# Patient Record
Sex: FEMALE | Race: OTHER | NOT HISPANIC OR LATINO
[De-identification: names, ages, dates, MRNs, and addresses within clinical notes are randomized per-mention and may not be internally consistent; named-entity substitution may affect disease eponyms.]

---

## 2018-03-16 PROBLEM — Z00.00 ENCOUNTER FOR PREVENTIVE HEALTH EXAMINATION: Status: ACTIVE | Noted: 2018-03-16

## 2018-05-15 ENCOUNTER — APPOINTMENT (OUTPATIENT)
Dept: FAMILY MEDICINE | Facility: CLINIC | Age: 63
End: 2018-05-15

## 2018-08-13 ENCOUNTER — APPOINTMENT (OUTPATIENT)
Dept: FAMILY MEDICINE | Facility: CLINIC | Age: 63
End: 2018-08-13

## 2020-02-05 ENCOUNTER — APPOINTMENT (OUTPATIENT)
Dept: OTOLARYNGOLOGY | Facility: CLINIC | Age: 65
End: 2020-02-05
Payer: MEDICARE

## 2020-02-05 VITALS
SYSTOLIC BLOOD PRESSURE: 119 MMHG | WEIGHT: 140 LBS | HEART RATE: 80 BPM | HEIGHT: 66 IN | BODY MASS INDEX: 22.5 KG/M2 | DIASTOLIC BLOOD PRESSURE: 78 MMHG

## 2020-02-05 DIAGNOSIS — Z86.39 PERSONAL HISTORY OF OTHER ENDOCRINE, NUTRITIONAL AND METABOLIC DISEASE: ICD-10-CM

## 2020-02-05 DIAGNOSIS — Z78.9 OTHER SPECIFIED HEALTH STATUS: ICD-10-CM

## 2020-02-05 PROCEDURE — 99203 OFFICE O/P NEW LOW 30 MIN: CPT

## 2020-02-05 NOTE — REVIEW OF SYSTEMS
[Ear Pain] : ear pain [Ear Itch] : ear itch [Lightheadedness] : lightheadedness [Ear Noises] : ear noises [Problem Snoring] : problem snoring [Anxiety] : anxiety [Easy Bruising] : a tendency for easy bruising [Negative] : Endocrine [Patient Intake Form Reviewed] : Patient intake form was reviewed

## 2020-10-09 ENCOUNTER — APPOINTMENT (OUTPATIENT)
Dept: OTOLARYNGOLOGY | Facility: CLINIC | Age: 65
End: 2020-10-09
Payer: MEDICARE

## 2020-10-09 VITALS — HEIGHT: 66 IN | BODY MASS INDEX: 21.69 KG/M2 | TEMPERATURE: 97.4 F | WEIGHT: 135 LBS

## 2020-10-09 DIAGNOSIS — H69.83 OTHER SPECIFIED DISORDERS OF EUSTACHIAN TUBE, BILATERAL: ICD-10-CM

## 2020-10-09 DIAGNOSIS — H92.01 OTALGIA, RIGHT EAR: ICD-10-CM

## 2020-10-09 PROCEDURE — 92557 COMPREHENSIVE HEARING TEST: CPT

## 2020-10-09 PROCEDURE — 92567 TYMPANOMETRY: CPT

## 2020-10-09 PROCEDURE — 99214 OFFICE O/P EST MOD 30 MIN: CPT | Mod: 25

## 2020-10-09 NOTE — PHYSICAL EXAM
[Midline] : trachea located in midline position [Normal] : no rashes [de-identified] : dayne cleared au

## 2020-10-09 NOTE — REVIEW OF SYSTEMS
[Dizziness] : dizziness [Negative] : Heme/Lymph [Patient Intake Form Reviewed] : Patient intake form was reviewed

## 2020-10-09 NOTE — ASSESSMENT
[FreeTextEntry1] : cerumen cleared\par hx most consistent w bppv\par trial flores daroff exercises\par consider vestibular rehab

## 2020-10-09 NOTE — HISTORY OF PRESENT ILLNESS
[de-identified] : co rt ear plugging no drainage\par usually hearing ok\par co room spinning daily lasts 30 sec occurring daily\par no co hearing , tinnitus or pressure, no migraine, no focal weakness

## 2020-10-20 ENCOUNTER — APPOINTMENT (OUTPATIENT)
Dept: DISASTER EMERGENCY | Facility: CLINIC | Age: 65
End: 2020-10-20

## 2020-10-20 DIAGNOSIS — Z01.818 ENCOUNTER FOR OTHER PREPROCEDURAL EXAMINATION: ICD-10-CM

## 2020-10-21 LAB — SARS-COV-2 N GENE NPH QL NAA+PROBE: NOT DETECTED

## 2020-10-22 ENCOUNTER — FORM ENCOUNTER (OUTPATIENT)
Age: 65
End: 2020-10-22

## 2020-10-22 NOTE — H&P PST ADULT - HISTORY OF PRESENT ILLNESS
65 year old female with h/o known mitral valve prolapse who c/o worsening SOB with exertion and fatigue.  Recent echo  revealed moderated MR.  Pt for JAMIN/R&LHC.      Symptoms:        Angina (Class):        Ischemic Symptoms:     Heart Failure:        Systolic/Diastolic/Combined:        NYHA Class (within 2 weeks):     Assessment of LVEF:       EF: 67%       Assessed by: echo       Date: 10/1/20    Prior Cardiac Interventions: NONE  1/26/2016 [facility]  EF 60%  normal coronary arteries           Noninvasive Testing:   Stress Test: Date:        Protocol:        Duration of Exercise:        Symptoms:        EKG Changes:        DTS:        Myocardial Imaging:        Risk Assessment:     Echo: 10/1/2020  EF 67%  mod MR  mod prolapse of mitral valve leaflets  mild TR    Antianginal Therapies:        Beta Blockers:         Calcium Channel Blockers:        Long Acting Nitrates:        Ranexa:     Associated Risk Factors:        Cerebrovascular Disease: N/A       Chronic Lung Disease: N/A       Peripheral Arterial Disease: N/A       Chronic Kidney Disease (if yes, what is GFR): N/A       Uncontrolled Diabetes (if yes, what is HgbA1C or FBS): N/A       Poorly Controlled Hypertension (if yes, what is SBP): N/A       Morbid Obesity (if yes, what is BMI): N/A       History of Recent Ventricular Arrhythmia: N/A       Inability to Ambulate Safely: N/A       Need for Therapeutic Anticoagulation: N/A       Antiplatelet or Contrast Allergy: N/A 65 year old female with h/o known mitral valve prolapse who c/o worsening SOB with exertion and fatigue.  Recent echo  revealed moderated MR.  Pt for JAMIN/R&LHC.  Pt currently very anxious and a poor historian       Symptoms: SOB        Angina (Class):        Ischemic Symptoms:     Heart Failure:        Systolic/Diastolic/Combined:        NYHA Class (within 2 weeks):     Assessment of LVEF:       EF: 67%       Assessed by: echo       Date: 10/1/20    Prior Cardiac Interventions: NONE  1/26/2016 [facility]  EF 60%  normal coronary arteries           Noninvasive Testing:   Stress Test: Date:        Protocol:        Duration of Exercise:        Symptoms:        EKG Changes:        DTS:        Myocardial Imaging:        Risk Assessment:     Echo: 10/1/2020  EF 67%  mod MR  mod prolapse of mitral valve leaflets  mild TR    Antianginal Therapies:        Beta Blockers:         Calcium Channel Blockers:        Long Acting Nitrates:        Ranexa:     Associated Risk Factors:        Cerebrovascular Disease: N/A       Chronic Lung Disease: N/A       Peripheral Arterial Disease: N/A       Chronic Kidney Disease (if yes, what is GFR): N/A       Uncontrolled Diabetes (if yes, what is HgbA1C or FBS): N/A       Poorly Controlled Hypertension (if yes, what is SBP): N/A       Morbid Obesity (if yes, what is BMI): N/A       History of Recent Ventricular Arrhythmia: N/A       Inability to Ambulate Safely: N/A       Need for Therapeutic Anticoagulation: N/A       Antiplatelet or Contrast Allergy: N/A

## 2020-10-22 NOTE — H&P PST ADULT - ASSESSMENT
59 year old female c/o SOB with worsening mitral regurgitation.  For JAMIN/L&RHC    ASA  Mallampti  GFR  Bleeding risk 59 year old female c/o SOB with worsening mitral regurgitation.  For JAMIN/L&RHC    ASA2  Mallampti2  GFR  Bleeding risk  PRE-PROCEDURE ASSESSMENT  JAMIN RHC/LHC -Patient seen and examined  -Labs and EKG reviewed   -Pre-procedure teaching completed with patient and family  -  Informed consent obtained -Questions answered  - Pt received [id number] prior to cardiac cath   Risks & benefits of procedure and sedation and risks and benefits for the alternative therapy have been explained to the patient in layman’s terms including but not limited to: allergic reaction, bleeding, infection, arrhythmia, respiratory compromise, renal and vascular compromise, limb damage, MI, CVA, emergent CABG/Vascular Surgery and death. Informed consent obtained and in chart.

## 2020-10-23 ENCOUNTER — TRANSCRIPTION ENCOUNTER (OUTPATIENT)
Age: 65
End: 2020-10-23

## 2020-10-23 ENCOUNTER — OUTPATIENT (OUTPATIENT)
Dept: OUTPATIENT SERVICES | Facility: HOSPITAL | Age: 65
LOS: 1 days | End: 2020-10-23
Payer: MEDICARE

## 2020-10-23 VITALS
SYSTOLIC BLOOD PRESSURE: 152 MMHG | TEMPERATURE: 98 F | OXYGEN SATURATION: 97 % | HEART RATE: 77 BPM | DIASTOLIC BLOOD PRESSURE: 79 MMHG | RESPIRATION RATE: 16 BRPM

## 2020-10-23 VITALS
DIASTOLIC BLOOD PRESSURE: 80 MMHG | SYSTOLIC BLOOD PRESSURE: 117 MMHG | OXYGEN SATURATION: 99 % | HEART RATE: 79 BPM | RESPIRATION RATE: 16 BRPM

## 2020-10-23 DIAGNOSIS — I34.0 NONRHEUMATIC MITRAL (VALVE) INSUFFICIENCY: ICD-10-CM

## 2020-10-23 DIAGNOSIS — Z98.890 OTHER SPECIFIED POSTPROCEDURAL STATES: Chronic | ICD-10-CM

## 2020-10-23 DIAGNOSIS — R06.02 SHORTNESS OF BREATH: ICD-10-CM

## 2020-10-23 LAB
ANION GAP SERPL CALC-SCNC: 11 MMOL/L — SIGNIFICANT CHANGE UP (ref 5–17)
APTT BLD: 29.8 SEC — SIGNIFICANT CHANGE UP (ref 27.5–35.5)
BUN SERPL-MCNC: 14 MG/DL — SIGNIFICANT CHANGE UP (ref 8–20)
CALCIUM SERPL-MCNC: 9.4 MG/DL — SIGNIFICANT CHANGE UP (ref 8.6–10.2)
CHLORIDE SERPL-SCNC: 102 MMOL/L — SIGNIFICANT CHANGE UP (ref 98–107)
CO2 SERPL-SCNC: 27 MMOL/L — SIGNIFICANT CHANGE UP (ref 22–29)
CREAT SERPL-MCNC: 0.58 MG/DL — SIGNIFICANT CHANGE UP (ref 0.5–1.3)
GLUCOSE SERPL-MCNC: 103 MG/DL — HIGH (ref 70–99)
HCT VFR BLD CALC: 42.8 % — SIGNIFICANT CHANGE UP (ref 34.5–45)
HGB BLD-MCNC: 14.4 G/DL — SIGNIFICANT CHANGE UP (ref 11.5–15.5)
INR BLD: 1.01 RATIO — SIGNIFICANT CHANGE UP (ref 0.88–1.16)
MCHC RBC-ENTMCNC: 29.9 PG — SIGNIFICANT CHANGE UP (ref 27–34)
MCHC RBC-ENTMCNC: 33.6 GM/DL — SIGNIFICANT CHANGE UP (ref 32–36)
MCV RBC AUTO: 89 FL — SIGNIFICANT CHANGE UP (ref 80–100)
PLATELET # BLD AUTO: 201 K/UL — SIGNIFICANT CHANGE UP (ref 150–400)
POTASSIUM SERPL-MCNC: 5.3 MMOL/L — SIGNIFICANT CHANGE UP (ref 3.5–5.3)
POTASSIUM SERPL-SCNC: 5.3 MMOL/L — SIGNIFICANT CHANGE UP (ref 3.5–5.3)
PROTHROM AB SERPL-ACNC: 11.7 SEC — SIGNIFICANT CHANGE UP (ref 10.6–13.6)
RBC # BLD: 4.81 M/UL — SIGNIFICANT CHANGE UP (ref 3.8–5.2)
RBC # FLD: 12.5 % — SIGNIFICANT CHANGE UP (ref 10.3–14.5)
SODIUM SERPL-SCNC: 140 MMOL/L — SIGNIFICANT CHANGE UP (ref 135–145)
WBC # BLD: 5.07 K/UL — SIGNIFICANT CHANGE UP (ref 3.8–10.5)
WBC # FLD AUTO: 5.07 K/UL — SIGNIFICANT CHANGE UP (ref 3.8–10.5)

## 2020-10-23 PROCEDURE — 80048 BASIC METABOLIC PNL TOTAL CA: CPT

## 2020-10-23 PROCEDURE — 36415 COLL VENOUS BLD VENIPUNCTURE: CPT

## 2020-10-23 PROCEDURE — 93456 R HRT CORONARY ARTERY ANGIO: CPT

## 2020-10-23 PROCEDURE — 85610 PROTHROMBIN TIME: CPT

## 2020-10-23 PROCEDURE — 99153 MOD SED SAME PHYS/QHP EA: CPT

## 2020-10-23 PROCEDURE — 93325 DOPPLER ECHO COLOR FLOW MAPG: CPT

## 2020-10-23 PROCEDURE — 93005 ELECTROCARDIOGRAM TRACING: CPT

## 2020-10-23 PROCEDURE — 85730 THROMBOPLASTIN TIME PARTIAL: CPT

## 2020-10-23 PROCEDURE — 76376 3D RENDER W/INTRP POSTPROCES: CPT | Mod: 26

## 2020-10-23 PROCEDURE — 85027 COMPLETE CBC AUTOMATED: CPT

## 2020-10-23 PROCEDURE — 99152 MOD SED SAME PHYS/QHP 5/>YRS: CPT

## 2020-10-23 PROCEDURE — 93010 ELECTROCARDIOGRAM REPORT: CPT

## 2020-10-23 PROCEDURE — 93325 DOPPLER ECHO COLOR FLOW MAPG: CPT | Mod: 26

## 2020-10-23 PROCEDURE — 93312 ECHO TRANSESOPHAGEAL: CPT | Mod: 26

## 2020-10-23 PROCEDURE — 93456 R HRT CORONARY ARTERY ANGIO: CPT | Mod: 26

## 2020-10-23 PROCEDURE — C1894: CPT

## 2020-10-23 PROCEDURE — 93320 DOPPLER ECHO COMPLETE: CPT

## 2020-10-23 PROCEDURE — 93312 ECHO TRANSESOPHAGEAL: CPT

## 2020-10-23 PROCEDURE — C1769: CPT

## 2020-10-23 PROCEDURE — 93320 DOPPLER ECHO COMPLETE: CPT | Mod: 26

## 2020-10-23 NOTE — DISCHARGE NOTE PROVIDER - CARE PROVIDER_API CALL
Kelvin De Dios  SURGERY  [address] [place] [id number]  Phone: [phone]  Fax: [phone]  Follow Up Time:

## 2020-10-23 NOTE — DISCHARGE NOTE PROVIDER - HOSPITAL COURSE
65 year old female with h/o known mitral valve prolapse who c/o worsening SOB with exertion and fatigue.  Recent echo  revealed moderated MR.  Pt for JAMIN/R&LHC.  Pt currently very anxious and a poor historian     s/p JAMIN Severe MR with P2 prolapse fail no clots, no PFO EF 70 -75     s/p LHC revealing non obstructive CAD Severe MR   RHC RA 4/1/1, RV 27/6 PA 28/6/15 wedge 8   PA sat 77.4%   RA sat 75 .6%   AO sat 97.3 %       Patient feels well.  Denies chest pain, shortness of breath, dizziness or palpitations at this time    Right brachial band venous in place removed with issue   Right radial hemoband in place.  Procedure site CDI, no bleeding, no hematoma, able to move all digits with capillary refill < 3 seconds, fingers warm  [facility] consult called    Reviewed case with Dr Sang nixon PCP notified   -vital signs, labs, diet and activity per post procedure orders  - ambulate ad carlton post bedrest  -iv hydration  -encourage PO fluids  -plan of care discussed with patient, family and MD   -post procedure and d/c instructions reviewed  -follow up with MD in 1-2 weeks  -Discussed therapeutic lifestyle changes to reduce risk factors such as following a cardiac diet, weight loss, maintaining a healthy weight, exercise, smoking cessation, medication compliance, and regular follow-up  with MD to know your numbers (BP, cholesterol, weight, and glucose)

## 2020-10-23 NOTE — CONSULT NOTE ADULT - ASSESSMENT
A/P: 65 year old female who comes in with known mitral valve disease (mild-moderate MR). She has is active at baseline, and has been having progressive shortness of breath over the last couple of weeks. Patient had recent JAMIN showing severe MR with P2 prolapse and flail segment no PFO noted. EF is 70-75%, normal RV size, prominent left atrial appendage with decrease left atrial velocities no clot noted.  Patient 10/23/20 is now s/p [facility] showing non-obstructive CAD, PA pressures 28/6/15 wedge 8, Moderate TR.  CT surgery consulted for evaluation.

## 2020-10-23 NOTE — CONSULT NOTE ADULT - SUBJECTIVE AND OBJECTIVE BOX
Surgeon: Dr. De Dios     Consult requesting by: Dr. Domínguez     HISTORY OF PRESENT ILLNESS:  65 year old female who comes in with known mitral valve disease (mild-moderate MR). She has is active at baseline, and has been having progressive shortness of breath over the last couple of weeks. Patient had recent JAMIN showing severe MR with P2 prolapse and flail segment no PFO noted. EF is 70-75%, normal RV size, prominent left atrial appendage with decrease left atrial velocities no clot noted.  Patient 10/23/20 is now s/p LHC showing non-obstructive CAD, PA pressures 28/6/15 wedge 8, Moderate TR.  CT surgery consulted for evaluation.       PAST MEDICAL & SURGICAL HISTORY:  Mitral valve disease    H/O foot surgery  2018 Left foot neuroma    Medications: vitamins                        Allergies    statins (Muscle Pain)      SOCIAL HISTORY:  Not a smoker  No drugs   Drinks occasionally     FAMILY HISTORY:  FH: heart disease        Review of Systems  At rest patient denies any complaints at this time       PHYSICAL EXAM  Neuro: intact, pain is well controlled   Pulm: clear to auscultation bilaterally, apices to bases   CV: S1,S2,RRR pansystolic murmur diastolic   Extremities: warm and well perfused, distal pulses intact       Vital Signs Last 24 Hrs  T(C): 36.7 (23 Oct 2020 08:55), Max: 36.7 (23 Oct 2020 08:55)  T(F): 98.1 (23 Oct 2020 08:55), Max: 98.1 (23 Oct 2020 08:55)  HR: 79 (23 Oct 2020 13:30) (69 - 79)  BP: 117/80 (23 Oct 2020 13:30) (111/65 - 152/79)  BP(mean): --  RR: 16 (23 Oct 2020 13:30) (15 - 16)  SpO2: 99% (23 Oct 2020 13:30) (95% - 99%)          LABS:                        14.4   5.07  )-----------( 201      ( 23 Oct 2020 08:50 )             42.8     10-23    140  |  102  |  14.0  ----------------------------<  103<H>  5.3   |  27.0  |  0.58    Ca    9.4      23 Oct 2020 08:50      PT/INR - ( 23 Oct 2020 08:50 )   PT: 11.7 sec;   INR: 1.01 ratio         PTT - ( 23 Oct 2020 08:50 )  PTT:29.8 sec      JAMIN 10/23/20:    Summary:   1. Left ventricular ejection fraction, by visual estimation, is 65 to 70%. (3D-EF of 69%]   2. Normal global left ventricular systolic function.   3. Spectral Doppler shows restrictive pattern of left ventricular myocardial filling (Grade III diastolic dysfunction).   4. Normal RV size and function, estimated RVSP of 34 mmHg mildly elevated.   5. Severely enlarged left atrium.   6. Mildly enlarged right atrium.   7. Primary severe mitral regurgitation, Beverly type II. There is prolapsed and flailed P2-scallop of the mitral valve with severe eccentric mitral regurgitation with anteriorly directed jet and Coanda effect, systolic right pulmonary veins reversal noted.   8. No evidence of mitral stenosis. Mean gradient of 2 mmHg (at HR of 83 bpm).   9. Moderate tricuspid regurgitation.  10. No left atrial appendage thrombus, prominent left atrial appendage and decreased left atrial appendage velocities.  11. Color flow doppler and intravenous injection of agitated saline demonstrates the presence of an intact intra atrial septum.  12. There is no evidence of pericardial effusion.  13. No prior study is available for comparison. Partially imaged echolucency noted in the liver, consider correlation with dedicated abdominal imaging.      10/23/20  Cardiac cath:   [facility]  Department of Cardiology  [address] [place] [address]  [phone]  Cath Lab Report -- Comprehensive Report  Patient: FORTINO PLATA  Study date: 10/23/2020  Account number: [id number]  MR number: [id number]  : 1955  Gender: Female  Race: O  Case Physician(s):  Hesham Domínguez MD  Referring Physician:  MD Hesham Parisi MD  INDICATIONS: Mitral valve insufficiency.  HISTORY: Patient with history of mitral regurgitation. Symtomatic with  class III CHF.  JAMIN with severe MR with flail posterior lealfet.  PROCEDURE:  --  Right heart catheterization.  --  Right coronary angiography.  --  Left coronary angiography.  TECHNIQUE: The risks and alternatives of the procedures and conscious  sedation were explained to the patient and informed consent was obtained.  Cardiac catheterization performed electively.  Local anesthetic given. Right radial artery access. Right brachial vein  access. Right heart catheterization. The procedure was performed utilizing  a catheter. Right coronary artery angiography. The vessel was injected  utilizing a catheter. Left coronary artery angiography. The vessel was  injected utilizing a catheter. RADIATION EXPOSURE: 3 min.  CONTRAST GIVEN: Omnipaque 37 ml.  MEDICATIONS GIVEN: Midazolam, 0.5 mg, IV. Fentanyl, 25 mcg, IV. Midazolam,  1 mg, IV. Verapamil (Isoptin, Calan, Covera), 5 mg, IA. Heparin, 4000  units, IA. 1% Lidocaine, 5 ml, subcutaneously. 1% Lidocaine, 5 ml,  subcutaneously.  HEMODYNAMICS: Hemodynamic assessment demonstrates normal pulmonary  capillary wedge pressure.  VENTRICLES: No LV gram was performed; however, a recent echocardiogram  demonstrated an EF of 55 %.  CORONARY VESSELS: The coronary circulation is right dominant.  LM:   --  LM: Normal.  LAD:   --  LAD: Normal.  CX:   --  Circumflex: Normal.  RCA:   --  RCA: Normal.  COMPLICATIONS: No complications occurred during the cath lab visit.  DIAGNOSTIC IMPRESSIONS: Normal coronary vasculature.  Normal right heart pressures.  DIAGNOSTIC RECOMMENDATIONS: Referal for CT surgery evaluaiton for mitral  valve surgery.  Prepared and signed by  Hesham Domínguez MD  Signed 10/23/2020 12:25:21

## 2020-10-23 NOTE — DISCHARGE NOTE NURSING/CASE MANAGEMENT/SOCIAL WORK - PATIENT PORTAL LINK FT
You can access the FollowMyHealth Patient Portal offered by [facility] [place] by registering at the following website: [email or web address]. By joining [employer]’s FollowMyHealth portal, you will also be able to view your health information using other applications (apps) compatible with our system.

## 2020-10-23 NOTE — CONSULT NOTE ADULT - PROBLEM SELECTOR RECOMMENDATION 9
Patient with Severe MR   PFTs, carotids, labs pending at this time   workup per CT surgery pending   Dr. De Dios made aware.

## 2020-10-23 NOTE — DISCHARGE NOTE PROVIDER - NSDCFUADDAPPT_GEN_ALL_CORE_FT
Restricted use with no heavy lifting of affected arm for 48 hours.  No submerging the arm in water for 48 hours.  You may start showering today.  Call your doctor for any bleeding, swelling, loss of sensation in the hand or fingers, or fingers turning blue.  If heavy bleeding or large lumps form, hold pressure at the spot and come to the Emergency Room.

## 2020-10-23 NOTE — PROGRESS NOTE ADULT - SUBJECTIVE AND OBJECTIVE BOX
s/p JAMIN Severe MR with P2 prolapse fail no clots, no PFO EF 70 -75     s/p LHC revealing non obstructive CAD Severe MR   RHC RA 4/1/1, RV 27/6 PA 28/6/15 wedge 8   PA sat 77.4%   RA sat 75 .6%   AO sat 97.3 %       Patient feels well.  Denies chest pain, shortness of breath, dizziness or palpitations at this time    Right brachial band venous in place removed with issue   Right radial hemoband in place.  Procedure site CDI, no bleeding, no hematoma, able to move all digits with capillary refill < 3 seconds, fingers warm      HPI:  65 year old female with h/o known mitral valve prolapse who c/o worsening SOB with exertion and fatigue.  Recent echo  revealed moderated MR.  Pt for JAMIN/R&LHC.  Pt currently very anxious and a poor historian       Symptoms: SOB minimal exertion        Assessment of LVEF:       EF: 67%       Assessed by: echo       Date: 10/1/20    Prior Cardiac Interventions: NONE  1/26/2016 [facility] 60%normal coronary arteries             Echo: 10/1/2020  EF 67%  mod MR  mod prolapse of mitral valve leaflets  mild TR      now s/p LHC revealing non obstructive CADdone via RRA and R brachial vein   normal coronaries and RH pressure   Severe MR with need for surgical evaluation   [facility] consult called    Reviewed case with Dr Sang nixon PCP notified   -vital signs, labs, diet and activity per post procedure orders  - ambulate ad carlton post bedrest  -iv hydration  -encourage PO fluids  -plan of care discussed with patient, family and MD   -post procedure and d/c instructions reviewed  -follow up with MD in 1-2 weeks  -Discussed therapeutic lifestyle changes to reduce risk factors such as following a cardiac diet, weight loss, maintaining a healthy weight, exercise, smoking cessation, medication compliance, and regular follow-up  with MD to know your numbers (BP, cholesterol, weight, and glucose)

## 2020-10-23 NOTE — DISCHARGE NOTE PROVIDER - NSDCMRMEDTOKEN_GEN_ALL_CORE_FT
Multiple Vitamins oral tablet: 1 tab(s) orally once a day  Vitamin B12 Methylcobalamin 2000 mcg oral capsule: orally once a day

## 2020-10-23 NOTE — DISCHARGE NOTE PROVIDER - NSDCCPCAREPLAN_GEN_ALL_CORE_FT
PRINCIPAL DISCHARGE DIAGNOSIS  Diagnosis: Severe mitral regurgitation  Assessment and Plan of Treatment: post JAMIN and cardiac cath   Wrist precautions   follow up with DR De Dios   Follow up with PCP

## 2020-10-27 ENCOUNTER — APPOINTMENT (OUTPATIENT)
Dept: CARDIOTHORACIC SURGERY | Facility: CLINIC | Age: 65
End: 2020-10-27
Payer: MEDICARE

## 2020-10-27 VITALS
HEART RATE: 78 BPM | WEIGHT: 140 LBS | RESPIRATION RATE: 16 BRPM | BODY MASS INDEX: 22.5 KG/M2 | HEIGHT: 66 IN | SYSTOLIC BLOOD PRESSURE: 118 MMHG | DIASTOLIC BLOOD PRESSURE: 76 MMHG | OXYGEN SATURATION: 97 %

## 2020-10-27 PROBLEM — I05.9 RHEUMATIC MITRAL VALVE DISEASE, UNSPECIFIED: Chronic | Status: ACTIVE | Noted: 2020-10-22

## 2020-10-27 PROCEDURE — 99072 ADDL SUPL MATRL&STAF TM PHE: CPT

## 2020-10-27 PROCEDURE — 99205 OFFICE O/P NEW HI 60 MIN: CPT

## 2020-10-27 NOTE — REVIEW OF SYSTEMS
[Feeling Poorly] : feeling poorly [Feeling Tired] : feeling tired [Palpitations] : palpitations [Lower Ext Edema] : lower extremity edema [Shortness Of Breath] : shortness of breath [SOB on Exertion] : shortness of breath during exertion [Negative] : Heme/Lymph [Chest Pain] : no chest pain

## 2020-10-27 NOTE — PHYSICAL EXAM
[General Appearance - Well Nourished] : well nourished [General Appearance - Well Developed] : well developed [Sclera] : the sclera and conjunctiva were normal [Outer Ear] : the ears and nose were normal in appearance [Neck Appearance] : the appearance of the neck was normal [Jugular Venous Distention Increased] : there was no jugular-venous distention [Respiration, Rhythm And Depth] : normal respiratory rhythm and effort [Auscultation Breath Sounds / Voice Sounds] : lungs were clear to auscultation bilaterally [Heart Rate And Rhythm] : heart rate was normal and rhythm regular [Examination Of The Chest] : the chest was normal in appearance [Chest Visual Inspection Thoracic Asymmetry] : no chest asymmetry [2+] : left 2+ [Abnormal Walk] : normal gait [Motor Tone] : muscle strength and tone were normal [Skin Color & Pigmentation] : normal skin color and pigmentation [Skin Lesions] : no skin lesions [Sensation] : the sensory exam was normal to light touch and pinprick [Motor Exam] : the motor exam was normal [Oriented To Time, Place, And Person] : oriented to person, place, and time [Impaired Insight] : insight and judgment were intact [Affect] : the affect was normal [FreeTextEntry1] : NYHAC III-IIII    Grade 3/6 systolic murmur [Fingers] :  capillary refill of the fingers was normal

## 2020-10-27 NOTE — ASSESSMENT
[FreeTextEntry1] : Ms Goel reports to the office today with complaints of severe shortness of breath that is debilitating. She is unable to walk up a flight of stairs at all and has a difficult time even speaking without severe shortness of breath.  After review of imaging, the mitral valve prolapse on JAMIN is severe. Given her symptoms I am recommending mitral valve repair with possible replacement with porcine valve. Should she require a replacement she will require Warfarin therapy. \par \par The procedure, hospital stay and recovery was discussed in detail. All risks, benefits, and alternatives discussed at length with patient. All questions addressed. Patient would like to proceed with surgical intervention as discussed.\par \par PLAN:\par - Mitral Valve Repair with Possible Replacement (Porcine Valve)\par \par \par \par Librado CURTIS NP am scribing for and in the presence of Dr. De Dios the following sections HISTORY OF PRESENT ILLNESS, PAST MEDICAL/FAMILY/SOCIAL HISTORY; REVIEW OF SYSTEMS; VITAL SIGNS; PHYSICAL EXAM; DISPOSITION.\par \par "I personally performed the services described in the documentation, reviewed the documentation recorded by the scribe in my presence and accurately and completely records my words and actions."\par

## 2020-10-27 NOTE — REASON FOR VISIT
[Initial Evaluation] : an initial evaluation [Spouse] : spouse [FreeTextEntry1] : mitral valve disease

## 2020-10-27 NOTE — CONSULT LETTER
[Dear  ___] : Dear  [unfilled], [Consult Letter:] : I had the pleasure of evaluating your patient, [unfilled]. [Please see my note below.] : Please see my note below. [Consult Closing:] : Thank you very much for allowing me to participate in the care of this patient.  If you have any questions, please do not hesitate to contact me. [Sincerely,] : Sincerely, [FreeTextEntry2] : Dr. Ad Dukes\par [address] [place] [address]\[place] [address]\par P: [phone]\par F: [phone] [FreeTextEntry3] : Kelvin De Dios MD\par  of Cardiothoracic Surgery\par [facility]\par [address] [place] [address] \[place] [address]\par [phone]\par

## 2020-10-30 ENCOUNTER — OUTPATIENT (OUTPATIENT)
Dept: OUTPATIENT SERVICES | Facility: HOSPITAL | Age: 65
LOS: 1 days | End: 2020-10-30
Payer: MEDICARE

## 2020-10-30 ENCOUNTER — APPOINTMENT (OUTPATIENT)
Dept: DISASTER EMERGENCY | Facility: CLINIC | Age: 65
End: 2020-10-30

## 2020-10-30 ENCOUNTER — RESULT REVIEW (OUTPATIENT)
Age: 65
End: 2020-10-30

## 2020-10-30 VITALS
TEMPERATURE: 97 F | HEIGHT: 64 IN | WEIGHT: 153.88 LBS | HEART RATE: 78 BPM | RESPIRATION RATE: 20 BRPM | SYSTOLIC BLOOD PRESSURE: 120 MMHG | DIASTOLIC BLOOD PRESSURE: 70 MMHG

## 2020-10-30 DIAGNOSIS — I34.0 NONRHEUMATIC MITRAL (VALVE) INSUFFICIENCY: ICD-10-CM

## 2020-10-30 DIAGNOSIS — Z98.890 OTHER SPECIFIED POSTPROCEDURAL STATES: Chronic | ICD-10-CM

## 2020-10-30 DIAGNOSIS — Z01.818 ENCOUNTER FOR OTHER PREPROCEDURAL EXAMINATION: ICD-10-CM

## 2020-10-30 DIAGNOSIS — Z29.9 ENCOUNTER FOR PROPHYLACTIC MEASURES, UNSPECIFIED: ICD-10-CM

## 2020-10-30 LAB
A1C WITH ESTIMATED AVERAGE GLUCOSE RESULT: 5 % — SIGNIFICANT CHANGE UP (ref 4–5.6)
ALBUMIN SERPL ELPH-MCNC: 4.3 G/DL — SIGNIFICANT CHANGE UP (ref 3.3–5.2)
ALP SERPL-CCNC: 65 U/L — SIGNIFICANT CHANGE UP (ref 40–120)
ALT FLD-CCNC: 13 U/L — SIGNIFICANT CHANGE UP
ANION GAP SERPL CALC-SCNC: 13 MMOL/L — SIGNIFICANT CHANGE UP (ref 5–17)
APPEARANCE UR: ABNORMAL
APTT BLD: 33.2 SEC — SIGNIFICANT CHANGE UP (ref 27.5–35.5)
AST SERPL-CCNC: 19 U/L — SIGNIFICANT CHANGE UP
BACTERIA # UR AUTO: ABNORMAL
BASOPHILS # BLD AUTO: 0.03 K/UL — SIGNIFICANT CHANGE UP (ref 0–0.2)
BASOPHILS NFR BLD AUTO: 0.7 % — SIGNIFICANT CHANGE UP (ref 0–2)
BILIRUB SERPL-MCNC: 0.5 MG/DL — SIGNIFICANT CHANGE UP (ref 0.4–2)
BILIRUB UR-MCNC: NEGATIVE — SIGNIFICANT CHANGE UP
BLD GP AB SCN SERPL QL: SIGNIFICANT CHANGE UP
BUN SERPL-MCNC: 16 MG/DL — SIGNIFICANT CHANGE UP (ref 8–20)
CALCIUM SERPL-MCNC: 9.6 MG/DL — SIGNIFICANT CHANGE UP (ref 8.6–10.2)
CHLORIDE SERPL-SCNC: 99 MMOL/L — SIGNIFICANT CHANGE UP (ref 98–107)
CO2 SERPL-SCNC: 26 MMOL/L — SIGNIFICANT CHANGE UP (ref 22–29)
COLOR SPEC: SIGNIFICANT CHANGE UP
COMMENT - URINE: SIGNIFICANT CHANGE UP
CREAT SERPL-MCNC: 0.63 MG/DL — SIGNIFICANT CHANGE UP (ref 0.5–1.3)
DIFF PNL FLD: ABNORMAL
EOSINOPHIL # BLD AUTO: 0.08 K/UL — SIGNIFICANT CHANGE UP (ref 0–0.5)
EOSINOPHIL NFR BLD AUTO: 1.8 % — SIGNIFICANT CHANGE UP (ref 0–6)
EPI CELLS # UR: SIGNIFICANT CHANGE UP
ESTIMATED AVERAGE GLUCOSE: 97 MG/DL — SIGNIFICANT CHANGE UP (ref 68–114)
GLUCOSE SERPL-MCNC: 100 MG/DL — HIGH (ref 70–99)
GLUCOSE UR QL: NEGATIVE — SIGNIFICANT CHANGE UP
HCT VFR BLD CALC: 43.4 % — SIGNIFICANT CHANGE UP (ref 34.5–45)
HGB BLD-MCNC: 14.6 G/DL — SIGNIFICANT CHANGE UP (ref 11.5–15.5)
IMM GRANULOCYTES NFR BLD AUTO: 0.2 % — SIGNIFICANT CHANGE UP (ref 0–1.5)
INR BLD: 1.01 RATIO — SIGNIFICANT CHANGE UP (ref 0.88–1.16)
KETONES UR-MCNC: ABNORMAL
LEUKOCYTE ESTERASE UR-ACNC: ABNORMAL
LYMPHOCYTES # BLD AUTO: 1.79 K/UL — SIGNIFICANT CHANGE UP (ref 1–3.3)
LYMPHOCYTES # BLD AUTO: 39.3 % — SIGNIFICANT CHANGE UP (ref 13–44)
MAGNESIUM SERPL-MCNC: 2 MG/DL — SIGNIFICANT CHANGE UP (ref 1.6–2.6)
MCHC RBC-ENTMCNC: 30.3 PG — SIGNIFICANT CHANGE UP (ref 27–34)
MCHC RBC-ENTMCNC: 33.6 GM/DL — SIGNIFICANT CHANGE UP (ref 32–36)
MCV RBC AUTO: 90 FL — SIGNIFICANT CHANGE UP (ref 80–100)
MONOCYTES # BLD AUTO: 0.33 K/UL — SIGNIFICANT CHANGE UP (ref 0–0.9)
MONOCYTES NFR BLD AUTO: 7.3 % — SIGNIFICANT CHANGE UP (ref 2–14)
MRSA PCR RESULT.: SIGNIFICANT CHANGE UP
NEUTROPHILS # BLD AUTO: 2.31 K/UL — SIGNIFICANT CHANGE UP (ref 1.8–7.4)
NEUTROPHILS NFR BLD AUTO: 50.7 % — SIGNIFICANT CHANGE UP (ref 43–77)
NITRITE UR-MCNC: POSITIVE
NT-PROBNP SERPL-SCNC: 156 PG/ML — SIGNIFICANT CHANGE UP (ref 0–300)
PH UR: 5 — SIGNIFICANT CHANGE UP (ref 5–8)
PHOSPHATE SERPL-MCNC: 4.1 MG/DL — SIGNIFICANT CHANGE UP (ref 2.4–4.7)
PLATELET # BLD AUTO: 239 K/UL — SIGNIFICANT CHANGE UP (ref 150–400)
POTASSIUM SERPL-MCNC: 4 MMOL/L — SIGNIFICANT CHANGE UP (ref 3.5–5.3)
POTASSIUM SERPL-SCNC: 4 MMOL/L — SIGNIFICANT CHANGE UP (ref 3.5–5.3)
PROT SERPL-MCNC: 6.7 G/DL — SIGNIFICANT CHANGE UP (ref 6.6–8.7)
PROT UR-MCNC: 15
PROTHROM AB SERPL-ACNC: 11.7 SEC — SIGNIFICANT CHANGE UP (ref 10.6–13.6)
RBC # BLD: 4.82 M/UL — SIGNIFICANT CHANGE UP (ref 3.8–5.2)
RBC # FLD: 12.4 % — SIGNIFICANT CHANGE UP (ref 10.3–14.5)
RBC CASTS # UR COMP ASSIST: SIGNIFICANT CHANGE UP /HPF (ref 0–4)
S AUREUS DNA NOSE QL NAA+PROBE: SIGNIFICANT CHANGE UP
SODIUM SERPL-SCNC: 138 MMOL/L — SIGNIFICANT CHANGE UP (ref 135–145)
SP GR SPEC: 1.03 — HIGH (ref 1.01–1.02)
T3 SERPL-MCNC: 110 NG/DL — SIGNIFICANT CHANGE UP (ref 80–200)
T4 AB SER-ACNC: 9.1 UG/DL — SIGNIFICANT CHANGE UP (ref 4.5–12)
TSH SERPL-MCNC: 1.57 UIU/ML — SIGNIFICANT CHANGE UP (ref 0.27–4.2)
UROBILINOGEN FLD QL: NEGATIVE — SIGNIFICANT CHANGE UP
WBC # BLD: 4.55 K/UL — SIGNIFICANT CHANGE UP (ref 3.8–10.5)
WBC # FLD AUTO: 4.55 K/UL — SIGNIFICANT CHANGE UP (ref 3.8–10.5)
WBC UR QL: SIGNIFICANT CHANGE UP

## 2020-10-30 PROCEDURE — 93880 EXTRACRANIAL BILAT STUDY: CPT | Mod: 26

## 2020-10-30 PROCEDURE — 71046 X-RAY EXAM CHEST 2 VIEWS: CPT

## 2020-10-30 PROCEDURE — 93880 EXTRACRANIAL BILAT STUDY: CPT

## 2020-10-30 PROCEDURE — 93005 ELECTROCARDIOGRAM TRACING: CPT

## 2020-10-30 PROCEDURE — G0463: CPT

## 2020-10-30 PROCEDURE — 71046 X-RAY EXAM CHEST 2 VIEWS: CPT | Mod: 26

## 2020-10-30 PROCEDURE — 93010 ELECTROCARDIOGRAM REPORT: CPT

## 2020-10-30 RX ORDER — CEFUROXIME AXETIL 250 MG
1500 TABLET ORAL ONCE
Refills: 0 | Status: DISCONTINUED | OUTPATIENT
Start: 2020-11-06 | End: 2020-11-06

## 2020-10-30 NOTE — H&P PST ADULT - NSICDXFAMILYHX_GEN_ALL_CORE_FT
FAMILY HISTORY:  FH: heart disease     FAMILY HISTORY:  Family history of aortic valve disorder  FH: heart disease, valve replace  FH: obesity  FH: stroke

## 2020-10-30 NOTE — H&P PST ADULT - ASSESSMENT
CAPRINI SCORE [CLOT]    AGE RELATED RISK FACTORS                                                       MOBILITY RELATED FACTORS  [ ] Age 41-60 years                                            (1 Point)                  [ ] Bed rest                                                        (1 Point)  [ ] Age: 61-74 years                                           (2 Points)                 [ ] Plaster cast                                                   (2 Points)  [ ] Age= 75 years                                              (3 Points)                 [ ] Bed bound for more than 72 hours                 (2 Points)    DISEASE RELATED RISK FACTORS                                               GENDER SPECIFIC FACTORS  [ ] Edema in the lower extremities                       (1 Point)                  [ ] Pregnancy                                                     (1 Point)  [ ] Varicose veins                                               (1 Point)                  [ ] Post-partum < 6 weeks                                   (1 Point)             [ ] BMI > 25 Kg/m2                                            (1 Point)                  [ ] Hormonal therapy  or oral contraception          (1 Point)                 [ ] Sepsis (in the previous month)                        (1 Point)                  [ ] History of pregnancy complications                 (1 point)  [ ] Pneumonia or serious lung disease                                               [ ] Unexplained or recurrent                     (1 Point)           (in the previous month)                               (1 Point)  [ ] Abnormal pulmonary function test                     (1 Point)                 SURGERY RELATED RISK FACTORS  [ ] Acute myocardial infarction                              (1 Point)                 [ ]  Section                                             (1 Point)  [ ] Congestive heart failure (in the previous month)  (1 Point)               [ ] Minor surgery                                                  (1 Point)   [ ] Inflammatory bowel disease                             (1 Point)                 [ ] Arthroscopic surgery                                        (2 Points)  [ ] Central venous access                                      (2 Points)                [ ] General surgery lasting more than 45 minutes   (2 Points)       [ ] Stroke (in the previous month)                          (5 Points)               [ ] Elective arthroplasty                                         (5 Points)                                                                                                                                               HEMATOLOGY RELATED FACTORS                                                 TRAUMA RELATED RISK FACTORS  [ ] Prior episodes of VTE                                     (3 Points)                [ ] Fracture of the hip, pelvis, or leg                       (5 Points)  [ ] Positive family history for VTE                         (3 Points)                 [ ] Acute spinal cord injury (in the previous month)  (5 Points)  [ ] Prothrombin 77069 A                                     (3 Points)                 [ ] Paralysis  (less than 1 month)                             (5 Points)  [ ] Factor V Leiden                                             (3 Points)                  [ ] Multiple Trauma within 1 month                        (5 Points)  [ ] Lupus anticoagulants                                     (3 Points)                                                           [ ] Anticardiolipin antibodies                               (3 Points)                                                       [ ] High homocysteine in the blood                      (3 Points)                                             [ ] Other congenital or acquired thrombophilia      (3 Points)                                                [ ] Heparin induced thrombocytopenia                  (3 Points)                                          Total Score [          ]    Caprini Score 0 - 2:  Low Risk, No VTE Prophylaxis required for most patients, encourage ambulation  Caprini Score 3 - 6:  At Risk, pharmacologic VTE prophylaxis is indicated for most patients (in the absence of a contraindication)  Caprini Score Greater than or = 7:  High Risk, pharmacologic VTE prophylaxis is indicated for most patients (in the absence of a contraindication)    65 year old female with h/o known mitral valve prolapse who c/o worsening SOB with exertion and fatigue.  Recent echo revealed moderated MR. Now schedule for valve repair Vs. replacement .  Patient and  educated on verbal and written pre op instructions CAPRINI SCORE [CLOT]    AGE RELATED RISK FACTORS                                                       MOBILITY RELATED FACTORS  [ ] Age 41-60 years                                            (1 Point)                  [ ] Bed rest                                                        (1 Point)  [x ] Age: 61-74 years                                           (2 Points)                 [ ] Plaster cast                                                   (2 Points)  [ ] Age= 75 years                                              (3 Points)                 [ ] Bed bound for more than 72 hours                 (2 Points)    DISEASE RELATED RISK FACTORS                                               GENDER SPECIFIC FACTORS  [ ] Edema in the lower extremities                       (1 Point)                  [ ] Pregnancy                                                     (1 Point)  [ x] Varicose veins                                               (1 Point)                  [ ] Post-partum < 6 weeks                                   (1 Point)             [ ] BMI > 25 Kg/m2                                            (1 Point)                  [ ] Hormonal therapy  or oral contraception          (1 Point)                 [ ] Sepsis (in the previous month)                        (1 Point)                  [ ] History of pregnancy complications                 (1 point)  [ ] Pneumonia or serious lung disease                                               [ ] Unexplained or recurrent                     (1 Point)           (in the previous month)                               (1 Point)  [ ] Abnormal pulmonary function test                     (1 Point)                 SURGERY RELATED RISK FACTORS  [ ] Acute myocardial infarction                              (1 Point)                 [ ]  Section                                             (1 Point)  [ ] Congestive heart failure (in the previous month)  (1 Point)               [ ] Minor surgery                                                  (1 Point)   [ ] Inflammatory bowel disease                             (1 Point)                 [ ] Arthroscopic surgery                                        (2 Points)  [ ] Central venous access                                      (2 Points)                [ x] General surgery lasting more than 45 minutes   (2 Points)       [ ] Stroke (in the previous month)                          (5 Points)               [ ] Elective arthroplasty                                         (5 Points)                                                                                                                                               HEMATOLOGY RELATED FACTORS                                                 TRAUMA RELATED RISK FACTORS  [ ] Prior episodes of VTE                                     (3 Points)                [ ] Fracture of the hip, pelvis, or leg                       (5 Points)  [ ] Positive family history for VTE                         (3 Points)                 [ ] Acute spinal cord injury (in the previous month)  (5 Points)  [ ] Prothrombin 41595 A                                     (3 Points)                 [ ] Paralysis  (less than 1 month)                             (5 Points)  [ ] Factor V Leiden                                             (3 Points)                  [ ] Multiple Trauma within 1 month                        (5 Points)  [ ] Lupus anticoagulants                                     (3 Points)                                                           [ ] Anticardiolipin antibodies                               (3 Points)                                                       [ ] High homocysteine in the blood                      (3 Points)                                             [ ] Other congenital or acquired thrombophilia      (3 Points)                                                [ ] Heparin induced thrombocytopenia                  (3 Points)                                          Total Score [  5    ]    Caprini Score 0 - 2:  Low Risk, No VTE Prophylaxis required for most patients, encourage ambulation  Caprini Score 3 - 6:  At Risk, pharmacologic VTE prophylaxis is indicated for most patients (in the absence of a contraindication)  Caprini Score Greater than or = 7:  High Risk, pharmacologic VTE prophylaxis is indicated for most patients (in the absence of a contraindication)    65 year old female with h/o known mitral valve prolapse who c/o worsening SOB with exertion and fatigue.  Recent echo revealed moderated MR. Now schedule for valve repair Vs. replacement .  Patient and  educated on verbal and written pre op instructions

## 2020-10-30 NOTE — H&P PST ADULT - NEGATIVE CARDIOVASCULAR SYMPTOMS
no claudication/no orthopnea/no paroxysmal nocturnal dyspnea/no palpitations/no peripheral edema/no chest pain

## 2020-10-30 NOTE — PATIENT PROFILE ADULT - NSPROHMSYMPCOND_GEN_A_NUR
Pre op teaching surgical scrub pain management instructions given to pt    Covid swab to be done Nov 3/none

## 2020-10-30 NOTE — H&P PST ADULT - NSICDXPROBLEM_GEN_ALL_CORE_FT
PROBLEM DIAGNOSES  Problem: Mitral regurgitation  Assessment and Plan: Mitral valve repair possible replacement     Problem: Need for prophylactic measure  Assessment and Plan: high risk, the surgical team will order appropriate VTE prophylaxis

## 2020-10-30 NOTE — PATIENT PROFILE ADULT - NSPREOP1_ABLETOREACHPT_GEN_A_NUR
[phone]    Denies domestic or international travel in the past 3 weeks yes/[phone]    Denies domestic or international travel in the past 3 weeks

## 2020-10-30 NOTE — H&P PST ADULT - HISTORY OF PRESENT ILLNESS
65 year old female with h/o known mitral valve prolapse who c/o worsening SOB with exertion and fatigue.  Recent echo revealed moderated MR.  Pt currently very anxious and a poor historian  in exam room assisted with history    65 year old female with h/o known mitral valve prolapse who c/o worsening SOB with exertion and fatigue.   state they have been monitoring the valve throughout the years.  He reports on the most recent echo revealed moderated MR.   She is now schedule for surgical repair.   Pt currently very anxious and a poor historian  in exam room assisted with history.    65 year old female with h/o known mitral valve prolapse who c/o worsening SOB with exertion and fatigue.   state they have been monitoring the valve throughout the years.  He reports on the most recent echo revealed moderated to severe MR.   She is now schedule for surgical repair.   Pt currently very anxious and a poor historian  in exam room assisted with history.

## 2020-10-30 NOTE — H&P PST ADULT - NS NEC GEN PE MLT EXAM PC
Problem: Patient Care Overview  Goal: Plan of Care/Patient Progress Review  Outcome: Improving  Patient  A/O  x4 ,  Forgetful at times. VSS. LS clear. Tele SR HR 57 IVF infusing.  bg 86, 115 and 115 at  0800, HS and 0200. On Regular diet  With fair  Colostomy intact, passing flatus. No c/o of abdominal pain, no chest pain or SOB. Colostomy care completed. Plan to discharge home  In 1-2 days if intact improves. Will monitor and continue POC.           No bruits; no thyromegaly or nodules

## 2020-10-31 LAB
CULTURE RESULTS: SIGNIFICANT CHANGE UP
SPECIMEN SOURCE: SIGNIFICANT CHANGE UP

## 2020-11-01 LAB — SARS-COV-2 N GENE NPH QL NAA+PROBE: NOT DETECTED

## 2020-11-02 ENCOUNTER — APPOINTMENT (OUTPATIENT)
Dept: PULMONOLOGY | Facility: CLINIC | Age: 65
End: 2020-11-02
Payer: MEDICARE

## 2020-11-02 VITALS — HEIGHT: 64 IN | BODY MASS INDEX: 25.95 KG/M2 | WEIGHT: 152 LBS

## 2020-11-02 DIAGNOSIS — R06.02 SHORTNESS OF BREATH: ICD-10-CM

## 2020-11-02 PROCEDURE — 94729 DIFFUSING CAPACITY: CPT

## 2020-11-02 PROCEDURE — 99072 ADDL SUPL MATRL&STAF TM PHE: CPT

## 2020-11-02 PROCEDURE — 85018 HEMOGLOBIN: CPT | Mod: QW

## 2020-11-02 PROCEDURE — 94727 GAS DIL/WSHOT DETER LNG VOL: CPT

## 2020-11-02 PROCEDURE — 94010 BREATHING CAPACITY TEST: CPT

## 2020-11-03 ENCOUNTER — APPOINTMENT (OUTPATIENT)
Dept: DISASTER EMERGENCY | Facility: CLINIC | Age: 65
End: 2020-11-03

## 2020-11-04 LAB — SARS-COV-2 N GENE NPH QL NAA+PROBE: NOT DETECTED

## 2020-11-06 ENCOUNTER — APPOINTMENT (OUTPATIENT)
Dept: CARDIOTHORACIC SURGERY | Facility: HOSPITAL | Age: 65
End: 2020-11-06

## 2020-11-06 ENCOUNTER — RESULT REVIEW (OUTPATIENT)
Age: 65
End: 2020-11-06

## 2020-11-06 ENCOUNTER — INPATIENT (INPATIENT)
Facility: HOSPITAL | Age: 65
LOS: 4 days | Discharge: HOME | DRG: 219 | End: 2020-11-11
Attending: THORACIC SURGERY (CARDIOTHORACIC VASCULAR SURGERY) | Admitting: THORACIC SURGERY (CARDIOTHORACIC VASCULAR SURGERY)
Payer: MEDICARE

## 2020-11-06 VITALS
RESPIRATION RATE: 16 BRPM | WEIGHT: 139.99 LBS | SYSTOLIC BLOOD PRESSURE: 128 MMHG | DIASTOLIC BLOOD PRESSURE: 75 MMHG | TEMPERATURE: 97 F | OXYGEN SATURATION: 98 % | HEART RATE: 85 BPM | HEIGHT: 66 IN

## 2020-11-06 DIAGNOSIS — I34.0 NONRHEUMATIC MITRAL (VALVE) INSUFFICIENCY: ICD-10-CM

## 2020-11-06 DIAGNOSIS — Z98.890 OTHER SPECIFIED POSTPROCEDURAL STATES: Chronic | ICD-10-CM

## 2020-11-06 LAB
ABO RH CONFIRMATION: SIGNIFICANT CHANGE UP
ALBUMIN SERPL ELPH-MCNC: 3.3 G/DL — SIGNIFICANT CHANGE UP (ref 3.3–5.2)
ALP SERPL-CCNC: 41 U/L — SIGNIFICANT CHANGE UP (ref 40–120)
ALT FLD-CCNC: 11 U/L — SIGNIFICANT CHANGE UP
ANION GAP SERPL CALC-SCNC: 11 MMOL/L — SIGNIFICANT CHANGE UP (ref 5–17)
APTT BLD: 31.1 SEC — SIGNIFICANT CHANGE UP (ref 27.5–35.5)
AST SERPL-CCNC: 44 U/L — HIGH
BASOPHILS # BLD AUTO: 0.03 K/UL — SIGNIFICANT CHANGE UP (ref 0–0.2)
BASOPHILS NFR BLD AUTO: 0.2 % — SIGNIFICANT CHANGE UP (ref 0–2)
BILIRUB SERPL-MCNC: 0.7 MG/DL — SIGNIFICANT CHANGE UP (ref 0.4–2)
BUN SERPL-MCNC: 11 MG/DL — SIGNIFICANT CHANGE UP (ref 8–20)
CALCIUM SERPL-MCNC: 8.2 MG/DL — LOW (ref 8.6–10.2)
CHLORIDE SERPL-SCNC: 106 MMOL/L — SIGNIFICANT CHANGE UP (ref 98–107)
CO2 SERPL-SCNC: 23 MMOL/L — SIGNIFICANT CHANGE UP (ref 22–29)
CREAT SERPL-MCNC: 0.44 MG/DL — LOW (ref 0.5–1.3)
EOSINOPHIL # BLD AUTO: 0.06 K/UL — SIGNIFICANT CHANGE UP (ref 0–0.5)
EOSINOPHIL NFR BLD AUTO: 0.4 % — SIGNIFICANT CHANGE UP (ref 0–6)
GAS PNL BLDA: SIGNIFICANT CHANGE UP
GLUCOSE BLDC GLUCOMTR-MCNC: 150 MG/DL — HIGH (ref 70–99)
GLUCOSE BLDC GLUCOMTR-MCNC: 165 MG/DL — HIGH (ref 70–99)
GLUCOSE BLDC GLUCOMTR-MCNC: 165 MG/DL — HIGH (ref 70–99)
GLUCOSE BLDC GLUCOMTR-MCNC: 167 MG/DL — HIGH (ref 70–99)
GLUCOSE BLDC GLUCOMTR-MCNC: 186 MG/DL — HIGH (ref 70–99)
GLUCOSE SERPL-MCNC: 163 MG/DL — HIGH (ref 70–99)
HCT VFR BLD CALC: 32.4 % — LOW (ref 34.5–45)
HGB BLD-MCNC: 11.2 G/DL — LOW (ref 11.5–15.5)
IMM GRANULOCYTES NFR BLD AUTO: 1.9 % — HIGH (ref 0–1.5)
INR BLD: 1.41 RATIO — HIGH (ref 0.88–1.16)
LYMPHOCYTES # BLD AUTO: 15.1 % — SIGNIFICANT CHANGE UP (ref 13–44)
LYMPHOCYTES # BLD AUTO: 2.09 K/UL — SIGNIFICANT CHANGE UP (ref 1–3.3)
MAGNESIUM SERPL-MCNC: 2.2 MG/DL — SIGNIFICANT CHANGE UP (ref 1.6–2.6)
MCHC RBC-ENTMCNC: 30.5 PG — SIGNIFICANT CHANGE UP (ref 27–34)
MCHC RBC-ENTMCNC: 34.6 GM/DL — SIGNIFICANT CHANGE UP (ref 32–36)
MCV RBC AUTO: 88.3 FL — SIGNIFICANT CHANGE UP (ref 80–100)
MONOCYTES # BLD AUTO: 0.59 K/UL — SIGNIFICANT CHANGE UP (ref 0–0.9)
MONOCYTES NFR BLD AUTO: 4.3 % — SIGNIFICANT CHANGE UP (ref 2–14)
NEUTROPHILS # BLD AUTO: 10.82 K/UL — HIGH (ref 1.8–7.4)
NEUTROPHILS NFR BLD AUTO: 78.1 % — HIGH (ref 43–77)
PLATELET # BLD AUTO: 148 K/UL — LOW (ref 150–400)
POTASSIUM SERPL-MCNC: 4.2 MMOL/L — SIGNIFICANT CHANGE UP (ref 3.5–5.3)
POTASSIUM SERPL-SCNC: 4.2 MMOL/L — SIGNIFICANT CHANGE UP (ref 3.5–5.3)
PROT SERPL-MCNC: 4.8 G/DL — LOW (ref 6.6–8.7)
PROTHROM AB SERPL-ACNC: 16.1 SEC — HIGH (ref 10.6–13.6)
RBC # BLD: 3.67 M/UL — LOW (ref 3.8–5.2)
RBC # FLD: 12.4 % — SIGNIFICANT CHANGE UP (ref 10.3–14.5)
SODIUM SERPL-SCNC: 140 MMOL/L — SIGNIFICANT CHANGE UP (ref 135–145)
WBC # BLD: 13.86 K/UL — HIGH (ref 3.8–10.5)
WBC # FLD AUTO: 13.86 K/UL — HIGH (ref 3.8–10.5)

## 2020-11-06 PROCEDURE — 71045 X-RAY EXAM CHEST 1 VIEW: CPT | Mod: 26

## 2020-11-06 PROCEDURE — 33427 REPAIR OF MITRAL VALVE: CPT | Mod: AS

## 2020-11-06 PROCEDURE — 33427 REPAIR OF MITRAL VALVE: CPT

## 2020-11-06 PROCEDURE — 88305 TISSUE EXAM BY PATHOLOGIST: CPT | Mod: 26

## 2020-11-06 PROCEDURE — 93355 ECHO TRANSESOPHAGEAL (TEE): CPT

## 2020-11-06 PROCEDURE — 71045 X-RAY EXAM CHEST 1 VIEW: CPT | Mod: 26,77

## 2020-11-06 PROCEDURE — 93010 ELECTROCARDIOGRAM REPORT: CPT

## 2020-11-06 RX ORDER — ALBUMIN HUMAN 25 %
250 VIAL (ML) INTRAVENOUS ONCE
Refills: 0 | Status: DISCONTINUED | OUTPATIENT
Start: 2020-11-06 | End: 2020-11-06

## 2020-11-06 RX ORDER — ALBUMIN HUMAN 25 %
250 VIAL (ML) INTRAVENOUS ONCE
Refills: 0 | Status: COMPLETED | OUTPATIENT
Start: 2020-11-06 | End: 2020-11-06

## 2020-11-06 RX ORDER — SODIUM CHLORIDE 9 MG/ML
1000 INJECTION INTRAMUSCULAR; INTRAVENOUS; SUBCUTANEOUS
Refills: 0 | Status: DISCONTINUED | OUTPATIENT
Start: 2020-11-06 | End: 2020-11-06

## 2020-11-06 RX ORDER — POTASSIUM CHLORIDE 20 MEQ
10 PACKET (EA) ORAL
Refills: 0 | Status: DISCONTINUED | OUTPATIENT
Start: 2020-11-06 | End: 2020-11-06

## 2020-11-06 RX ORDER — NOREPINEPHRINE BITARTRATE/D5W 8 MG/250ML
0.05 PLASTIC BAG, INJECTION (ML) INTRAVENOUS
Qty: 8 | Refills: 0 | Status: DISCONTINUED | OUTPATIENT
Start: 2020-11-06 | End: 2020-11-07

## 2020-11-06 RX ORDER — ASPIRIN/CALCIUM CARB/MAGNESIUM 324 MG
325 TABLET ORAL DAILY
Refills: 0 | Status: DISCONTINUED | OUTPATIENT
Start: 2020-11-07 | End: 2020-11-11

## 2020-11-06 RX ORDER — CALCIUM GLUCONATE 100 MG/ML
1 VIAL (ML) INTRAVENOUS ONCE
Refills: 0 | Status: COMPLETED | OUTPATIENT
Start: 2020-11-06 | End: 2020-11-06

## 2020-11-06 RX ORDER — PROPOFOL 10 MG/ML
5 INJECTION, EMULSION INTRAVENOUS
Qty: 1000 | Refills: 0 | Status: DISCONTINUED | OUTPATIENT
Start: 2020-11-06 | End: 2020-11-07

## 2020-11-06 RX ORDER — PANTOPRAZOLE SODIUM 20 MG/1
40 TABLET, DELAYED RELEASE ORAL ONCE
Refills: 0 | Status: DISCONTINUED | OUTPATIENT
Start: 2020-11-06 | End: 2020-11-06

## 2020-11-06 RX ORDER — DEXTROSE 50 % IN WATER 50 %
25 SYRINGE (ML) INTRAVENOUS
Refills: 0 | Status: DISCONTINUED | OUTPATIENT
Start: 2020-11-06 | End: 2020-11-06

## 2020-11-06 RX ORDER — CHLORHEXIDINE GLUCONATE 213 G/1000ML
15 SOLUTION TOPICAL EVERY 12 HOURS
Refills: 0 | Status: DISCONTINUED | OUTPATIENT
Start: 2020-11-06 | End: 2020-11-06

## 2020-11-06 RX ORDER — POTASSIUM CHLORIDE 20 MEQ
10 PACKET (EA) ORAL
Refills: 0 | Status: COMPLETED | OUTPATIENT
Start: 2020-11-06 | End: 2020-11-06

## 2020-11-06 RX ORDER — CHLORHEXIDINE GLUCONATE 213 G/1000ML
5 SOLUTION TOPICAL
Refills: 0 | Status: DISCONTINUED | OUTPATIENT
Start: 2020-11-06 | End: 2020-11-08

## 2020-11-06 RX ORDER — SODIUM CHLORIDE 9 MG/ML
1000 INJECTION INTRAMUSCULAR; INTRAVENOUS; SUBCUTANEOUS
Refills: 0 | Status: DISCONTINUED | OUTPATIENT
Start: 2020-11-06 | End: 2020-11-08

## 2020-11-06 RX ORDER — DEXMEDETOMIDINE HYDROCHLORIDE IN 0.9% SODIUM CHLORIDE 4 UG/ML
0.2 INJECTION INTRAVENOUS
Qty: 200 | Refills: 0 | Status: DISCONTINUED | OUTPATIENT
Start: 2020-11-06 | End: 2020-11-07

## 2020-11-06 RX ORDER — POTASSIUM CHLORIDE 20 MEQ
10 PACKET (EA) ORAL
Refills: 0 | Status: DISCONTINUED | OUTPATIENT
Start: 2020-11-06 | End: 2020-11-07

## 2020-11-06 RX ORDER — CHLORHEXIDINE GLUCONATE 213 G/1000ML
5 SOLUTION TOPICAL
Refills: 0 | Status: DISCONTINUED | OUTPATIENT
Start: 2020-11-06 | End: 2020-11-06

## 2020-11-06 RX ORDER — DEXTROSE 50 % IN WATER 50 %
50 SYRINGE (ML) INTRAVENOUS
Refills: 0 | Status: DISCONTINUED | OUTPATIENT
Start: 2020-11-06 | End: 2020-11-06

## 2020-11-06 RX ORDER — VANCOMYCIN HCL 1 G
1000 VIAL (EA) INTRAVENOUS EVERY 12 HOURS
Refills: 0 | Status: COMPLETED | OUTPATIENT
Start: 2020-11-06 | End: 2020-11-08

## 2020-11-06 RX ORDER — MEPERIDINE HYDROCHLORIDE 50 MG/ML
25 INJECTION INTRAMUSCULAR; INTRAVENOUS; SUBCUTANEOUS ONCE
Refills: 0 | Status: DISCONTINUED | OUTPATIENT
Start: 2020-11-06 | End: 2020-11-06

## 2020-11-06 RX ORDER — PANTOPRAZOLE SODIUM 20 MG/1
40 TABLET, DELAYED RELEASE ORAL ONCE
Refills: 0 | Status: COMPLETED | OUTPATIENT
Start: 2020-11-06 | End: 2020-11-06

## 2020-11-06 RX ORDER — CEFUROXIME AXETIL 250 MG
1500 TABLET ORAL EVERY 8 HOURS
Refills: 0 | Status: COMPLETED | OUTPATIENT
Start: 2020-11-06 | End: 2020-11-08

## 2020-11-06 RX ORDER — SODIUM CHLORIDE 9 MG/ML
3 INJECTION INTRAMUSCULAR; INTRAVENOUS; SUBCUTANEOUS EVERY 8 HOURS
Refills: 0 | Status: DISCONTINUED | OUTPATIENT
Start: 2020-11-06 | End: 2020-11-06

## 2020-11-06 RX ORDER — FENTANYL CITRATE 50 UG/ML
50 INJECTION INTRAVENOUS
Refills: 0 | Status: DISCONTINUED | OUTPATIENT
Start: 2020-11-06 | End: 2020-11-07

## 2020-11-06 RX ORDER — POLYETHYLENE GLYCOL 3350 17 G/17G
17 POWDER, FOR SOLUTION ORAL DAILY
Refills: 0 | Status: DISCONTINUED | OUTPATIENT
Start: 2020-11-07 | End: 2020-11-11

## 2020-11-06 RX ORDER — PANTOPRAZOLE SODIUM 20 MG/1
40 TABLET, DELAYED RELEASE ORAL DAILY
Refills: 0 | Status: DISCONTINUED | OUTPATIENT
Start: 2020-11-07 | End: 2020-11-11

## 2020-11-06 RX ORDER — INSULIN HUMAN 100 [IU]/ML
2 INJECTION, SOLUTION SUBCUTANEOUS
Qty: 250 | Refills: 0 | Status: DISCONTINUED | OUTPATIENT
Start: 2020-11-06 | End: 2020-11-07

## 2020-11-06 RX ORDER — INSULIN HUMAN 100 [IU]/ML
2 INJECTION, SOLUTION SUBCUTANEOUS
Qty: 50 | Refills: 0 | Status: DISCONTINUED | OUTPATIENT
Start: 2020-11-06 | End: 2020-11-06

## 2020-11-06 RX ORDER — ACETAMINOPHEN 500 MG
1000 TABLET ORAL ONCE
Refills: 0 | Status: COMPLETED | OUTPATIENT
Start: 2020-11-06 | End: 2020-11-06

## 2020-11-06 RX ADMIN — Medication 100 MILLIEQUIVALENT(S): at 21:38

## 2020-11-06 RX ADMIN — Medication 125 MILLILITER(S): at 21:30

## 2020-11-06 RX ADMIN — Medication 100 MILLIEQUIVALENT(S): at 14:52

## 2020-11-06 RX ADMIN — Medication 125 MILLILITER(S): at 19:40

## 2020-11-06 RX ADMIN — Medication 100 MILLIEQUIVALENT(S): at 16:18

## 2020-11-06 RX ADMIN — Medication 125 MILLILITER(S): at 17:09

## 2020-11-06 RX ADMIN — PANTOPRAZOLE SODIUM 40 MILLIGRAM(S): 20 TABLET, DELAYED RELEASE ORAL at 19:34

## 2020-11-06 RX ADMIN — Medication 125 MILLILITER(S): at 14:52

## 2020-11-06 RX ADMIN — Medication 100 MILLIEQUIVALENT(S): at 15:34

## 2020-11-06 RX ADMIN — Medication 100 MILLIGRAM(S): at 17:09

## 2020-11-06 RX ADMIN — Medication 100 GRAM(S): at 17:21

## 2020-11-06 RX ADMIN — PROPOFOL 1.91 MICROGRAM(S)/KG/MIN: 10 INJECTION, EMULSION INTRAVENOUS at 18:48

## 2020-11-06 RX ADMIN — Medication 100 MILLIEQUIVALENT(S): at 23:30

## 2020-11-06 RX ADMIN — Medication 250 MILLIGRAM(S): at 19:34

## 2020-11-06 RX ADMIN — Medication 100 MILLIEQUIVALENT(S): at 22:30

## 2020-11-06 RX ADMIN — Medication 1000 MILLIGRAM(S): at 22:15

## 2020-11-06 RX ADMIN — CHLORHEXIDINE GLUCONATE 5 MILLILITER(S): 213 SOLUTION TOPICAL at 18:48

## 2020-11-06 RX ADMIN — Medication 400 MILLIGRAM(S): at 22:00

## 2020-11-06 RX ADMIN — ONDANSETRON 4 MILLIGRAM(S): 8 TABLET, FILM COATED ORAL at 23:00

## 2020-11-06 NOTE — BRIEF OPERATIVE NOTE - NSICDXBRIEFPROCEDURE_GEN_ALL_CORE_FT
PROCEDURES:  Repair, mitral valve, with JAMIN 06-Nov-2020 13:23:44 30mm Zaragoza band, P2 butterfly resection Stephanie Nicole L

## 2020-11-06 NOTE — BRIEF OPERATIVE NOTE - COMMENTS
No qualified resident was available to assist in this case. I have personally first assisted the Cardiothoracic Surgeon listed in this brief op note throughout the entirety of this case.     To [facility] on [facility]

## 2020-11-07 LAB
ALBUMIN SERPL ELPH-MCNC: 3.9 G/DL — SIGNIFICANT CHANGE UP (ref 3.3–5.2)
ALBUMIN SERPL ELPH-MCNC: 4.2 G/DL — SIGNIFICANT CHANGE UP (ref 3.3–5.2)
ALP SERPL-CCNC: 30 U/L — LOW (ref 40–120)
ALP SERPL-CCNC: 30 U/L — LOW (ref 40–120)
ALT FLD-CCNC: 12 U/L — SIGNIFICANT CHANGE UP
ALT FLD-CCNC: 13 U/L — SIGNIFICANT CHANGE UP
ANION GAP SERPL CALC-SCNC: 10 MMOL/L — SIGNIFICANT CHANGE UP (ref 5–17)
ANION GAP SERPL CALC-SCNC: 12 MMOL/L — SIGNIFICANT CHANGE UP (ref 5–17)
APTT BLD: 27.6 SEC — SIGNIFICANT CHANGE UP (ref 27.5–35.5)
AST SERPL-CCNC: 31 U/L — SIGNIFICANT CHANGE UP
AST SERPL-CCNC: 39 U/L — HIGH
BASOPHILS # BLD AUTO: 0 K/UL — SIGNIFICANT CHANGE UP (ref 0–0.2)
BASOPHILS NFR BLD AUTO: 0 % — SIGNIFICANT CHANGE UP (ref 0–2)
BILIRUB SERPL-MCNC: 0.9 MG/DL — SIGNIFICANT CHANGE UP (ref 0.4–2)
BILIRUB SERPL-MCNC: 1 MG/DL — SIGNIFICANT CHANGE UP (ref 0.4–2)
BUN SERPL-MCNC: 11 MG/DL — SIGNIFICANT CHANGE UP (ref 8–20)
BUN SERPL-MCNC: 11 MG/DL — SIGNIFICANT CHANGE UP (ref 8–20)
CALCIUM SERPL-MCNC: 8.3 MG/DL — LOW (ref 8.6–10.2)
CALCIUM SERPL-MCNC: 8.5 MG/DL — LOW (ref 8.6–10.2)
CHLORIDE SERPL-SCNC: 107 MMOL/L — SIGNIFICANT CHANGE UP (ref 98–107)
CHLORIDE SERPL-SCNC: 110 MMOL/L — HIGH (ref 98–107)
CO2 SERPL-SCNC: 22 MMOL/L — SIGNIFICANT CHANGE UP (ref 22–29)
CO2 SERPL-SCNC: 22 MMOL/L — SIGNIFICANT CHANGE UP (ref 22–29)
CREAT SERPL-MCNC: 0.49 MG/DL — LOW (ref 0.5–1.3)
CREAT SERPL-MCNC: 0.58 MG/DL — SIGNIFICANT CHANGE UP (ref 0.5–1.3)
EOSINOPHIL # BLD AUTO: 0 K/UL — SIGNIFICANT CHANGE UP (ref 0–0.5)
EOSINOPHIL NFR BLD AUTO: 0 % — SIGNIFICANT CHANGE UP (ref 0–6)
GAS PNL BLDA: SIGNIFICANT CHANGE UP
GLUCOSE BLDC GLUCOMTR-MCNC: 111 MG/DL — HIGH (ref 70–99)
GLUCOSE BLDC GLUCOMTR-MCNC: 118 MG/DL — HIGH (ref 70–99)
GLUCOSE BLDC GLUCOMTR-MCNC: 120 MG/DL — HIGH (ref 70–99)
GLUCOSE BLDC GLUCOMTR-MCNC: 120 MG/DL — HIGH (ref 70–99)
GLUCOSE BLDC GLUCOMTR-MCNC: 122 MG/DL — HIGH (ref 70–99)
GLUCOSE BLDC GLUCOMTR-MCNC: 124 MG/DL — HIGH (ref 70–99)
GLUCOSE BLDC GLUCOMTR-MCNC: 127 MG/DL — HIGH (ref 70–99)
GLUCOSE BLDC GLUCOMTR-MCNC: 132 MG/DL — HIGH (ref 70–99)
GLUCOSE BLDC GLUCOMTR-MCNC: 92 MG/DL — SIGNIFICANT CHANGE UP (ref 70–99)
GLUCOSE SERPL-MCNC: 115 MG/DL — HIGH (ref 70–99)
GLUCOSE SERPL-MCNC: 132 MG/DL — HIGH (ref 70–99)
HCT VFR BLD CALC: 24.7 % — LOW (ref 34.5–45)
HCT VFR BLD CALC: 27.6 % — LOW (ref 34.5–45)
HGB BLD-MCNC: 8.4 G/DL — LOW (ref 11.5–15.5)
HGB BLD-MCNC: 9.5 G/DL — LOW (ref 11.5–15.5)
IMM GRANULOCYTES NFR BLD AUTO: 0.6 % — SIGNIFICANT CHANGE UP (ref 0–1.5)
INR BLD: 1.25 RATIO — HIGH (ref 0.88–1.16)
LYMPHOCYTES # BLD AUTO: 0.8 K/UL — LOW (ref 1–3.3)
LYMPHOCYTES # BLD AUTO: 9.9 % — LOW (ref 13–44)
MAGNESIUM SERPL-MCNC: 1.9 MG/DL — SIGNIFICANT CHANGE UP (ref 1.6–2.6)
MAGNESIUM SERPL-MCNC: 2.2 MG/DL — SIGNIFICANT CHANGE UP (ref 1.6–2.6)
MCHC RBC-ENTMCNC: 30.3 PG — SIGNIFICANT CHANGE UP (ref 27–34)
MCHC RBC-ENTMCNC: 31 PG — SIGNIFICANT CHANGE UP (ref 27–34)
MCHC RBC-ENTMCNC: 34 GM/DL — SIGNIFICANT CHANGE UP (ref 32–36)
MCHC RBC-ENTMCNC: 34.4 GM/DL — SIGNIFICANT CHANGE UP (ref 32–36)
MCV RBC AUTO: 89.2 FL — SIGNIFICANT CHANGE UP (ref 80–100)
MCV RBC AUTO: 90.2 FL — SIGNIFICANT CHANGE UP (ref 80–100)
MONOCYTES # BLD AUTO: 0.42 K/UL — SIGNIFICANT CHANGE UP (ref 0–0.9)
MONOCYTES NFR BLD AUTO: 5.2 % — SIGNIFICANT CHANGE UP (ref 2–14)
NEUTROPHILS # BLD AUTO: 6.83 K/UL — SIGNIFICANT CHANGE UP (ref 1.8–7.4)
NEUTROPHILS NFR BLD AUTO: 84.3 % — HIGH (ref 43–77)
PLATELET # BLD AUTO: 117 K/UL — LOW (ref 150–400)
PLATELET # BLD AUTO: 148 K/UL — LOW (ref 150–400)
POTASSIUM SERPL-MCNC: 4.8 MMOL/L — SIGNIFICANT CHANGE UP (ref 3.5–5.3)
POTASSIUM SERPL-MCNC: 5.1 MMOL/L — SIGNIFICANT CHANGE UP (ref 3.5–5.3)
POTASSIUM SERPL-SCNC: 4.8 MMOL/L — SIGNIFICANT CHANGE UP (ref 3.5–5.3)
POTASSIUM SERPL-SCNC: 5.1 MMOL/L — SIGNIFICANT CHANGE UP (ref 3.5–5.3)
PROT SERPL-MCNC: 5.3 G/DL — LOW (ref 6.6–8.7)
PROT SERPL-MCNC: 5.5 G/DL — LOW (ref 6.6–8.7)
PROTHROM AB SERPL-ACNC: 14.3 SEC — HIGH (ref 10.6–13.6)
RBC # BLD: 2.77 M/UL — LOW (ref 3.8–5.2)
RBC # BLD: 3.06 M/UL — LOW (ref 3.8–5.2)
RBC # FLD: 12.7 % — SIGNIFICANT CHANGE UP (ref 10.3–14.5)
RBC # FLD: 13.4 % — SIGNIFICANT CHANGE UP (ref 10.3–14.5)
SODIUM SERPL-SCNC: 141 MMOL/L — SIGNIFICANT CHANGE UP (ref 135–145)
SODIUM SERPL-SCNC: 142 MMOL/L — SIGNIFICANT CHANGE UP (ref 135–145)
WBC # BLD: 8.1 K/UL — SIGNIFICANT CHANGE UP (ref 3.8–10.5)
WBC # BLD: 9.44 K/UL — SIGNIFICANT CHANGE UP (ref 3.8–10.5)
WBC # FLD AUTO: 8.1 K/UL — SIGNIFICANT CHANGE UP (ref 3.8–10.5)
WBC # FLD AUTO: 9.44 K/UL — SIGNIFICANT CHANGE UP (ref 3.8–10.5)

## 2020-11-07 PROCEDURE — 71045 X-RAY EXAM CHEST 1 VIEW: CPT | Mod: 26

## 2020-11-07 PROCEDURE — 99024 POSTOP FOLLOW-UP VISIT: CPT

## 2020-11-07 RX ORDER — ONDANSETRON 8 MG/1
4 TABLET, FILM COATED ORAL ONCE
Refills: 0 | Status: COMPLETED | OUTPATIENT
Start: 2020-11-07 | End: 2020-11-07

## 2020-11-07 RX ORDER — ENOXAPARIN SODIUM 100 MG/ML
40 INJECTION SUBCUTANEOUS ONCE
Refills: 0 | Status: COMPLETED | OUTPATIENT
Start: 2020-11-07 | End: 2020-11-07

## 2020-11-07 RX ORDER — FUROSEMIDE 40 MG
20 TABLET ORAL ONCE
Refills: 0 | Status: COMPLETED | OUTPATIENT
Start: 2020-11-07 | End: 2020-11-07

## 2020-11-07 RX ORDER — MAGNESIUM SULFATE 500 MG/ML
2 VIAL (ML) INJECTION ONCE
Refills: 0 | Status: COMPLETED | OUTPATIENT
Start: 2020-11-07 | End: 2020-11-07

## 2020-11-07 RX ORDER — OXYCODONE HYDROCHLORIDE 5 MG/1
5 TABLET ORAL EVERY 6 HOURS
Refills: 0 | Status: DISCONTINUED | OUTPATIENT
Start: 2020-11-07 | End: 2020-11-09

## 2020-11-07 RX ORDER — ONDANSETRON 8 MG/1
4 TABLET, FILM COATED ORAL ONCE
Refills: 0 | Status: COMPLETED | OUTPATIENT
Start: 2020-11-07 | End: 2020-11-06

## 2020-11-07 RX ORDER — ACETAMINOPHEN 500 MG
1000 TABLET ORAL ONCE
Refills: 0 | Status: COMPLETED | OUTPATIENT
Start: 2020-11-07 | End: 2020-11-07

## 2020-11-07 RX ORDER — SENNA PLUS 8.6 MG/1
2 TABLET ORAL AT BEDTIME
Refills: 0 | Status: DISCONTINUED | OUTPATIENT
Start: 2020-11-07 | End: 2020-11-11

## 2020-11-07 RX ORDER — OXYCODONE HYDROCHLORIDE 5 MG/1
10 TABLET ORAL EVERY 6 HOURS
Refills: 0 | Status: DISCONTINUED | OUTPATIENT
Start: 2020-11-07 | End: 2020-11-09

## 2020-11-07 RX ORDER — HYDROMORPHONE HYDROCHLORIDE 2 MG/ML
0.25 INJECTION INTRAMUSCULAR; INTRAVENOUS; SUBCUTANEOUS ONCE
Refills: 0 | Status: DISCONTINUED | OUTPATIENT
Start: 2020-11-07 | End: 2020-11-07

## 2020-11-07 RX ORDER — INSULIN LISPRO 100/ML
VIAL (ML) SUBCUTANEOUS
Refills: 0 | Status: DISCONTINUED | OUTPATIENT
Start: 2020-11-07 | End: 2020-11-09

## 2020-11-07 RX ORDER — ALBUMIN HUMAN 25 %
250 VIAL (ML) INTRAVENOUS ONCE
Refills: 0 | Status: COMPLETED | OUTPATIENT
Start: 2020-11-07 | End: 2020-11-07

## 2020-11-07 RX ADMIN — SENNA PLUS 2 TABLET(S): 8.6 TABLET ORAL at 21:53

## 2020-11-07 RX ADMIN — Medication 250 MILLIGRAM(S): at 18:54

## 2020-11-07 RX ADMIN — Medication 20 MILLIGRAM(S): at 13:00

## 2020-11-07 RX ADMIN — HYDROMORPHONE HYDROCHLORIDE 0.25 MILLIGRAM(S): 2 INJECTION INTRAMUSCULAR; INTRAVENOUS; SUBCUTANEOUS at 00:30

## 2020-11-07 RX ADMIN — Medication 1000 MILLIGRAM(S): at 19:15

## 2020-11-07 RX ADMIN — ENOXAPARIN SODIUM 40 MILLIGRAM(S): 100 INJECTION SUBCUTANEOUS at 12:40

## 2020-11-07 RX ADMIN — HYDROMORPHONE HYDROCHLORIDE 0.25 MILLIGRAM(S): 2 INJECTION INTRAMUSCULAR; INTRAVENOUS; SUBCUTANEOUS at 00:45

## 2020-11-07 RX ADMIN — Medication 250 MILLIGRAM(S): at 07:34

## 2020-11-07 RX ADMIN — Medication 100 MILLIGRAM(S): at 02:17

## 2020-11-07 RX ADMIN — Medication 50 GRAM(S): at 05:06

## 2020-11-07 RX ADMIN — ONDANSETRON 4 MILLIGRAM(S): 8 TABLET, FILM COATED ORAL at 06:36

## 2020-11-07 RX ADMIN — PANTOPRAZOLE SODIUM 40 MILLIGRAM(S): 20 TABLET, DELAYED RELEASE ORAL at 11:11

## 2020-11-07 RX ADMIN — Medication 400 MILLIGRAM(S): at 18:50

## 2020-11-07 RX ADMIN — Medication 100 MILLIGRAM(S): at 11:10

## 2020-11-07 RX ADMIN — Medication 325 MILLIGRAM(S): at 11:11

## 2020-11-07 RX ADMIN — Medication 125 MILLILITER(S): at 06:27

## 2020-11-07 RX ADMIN — ONDANSETRON 4 MILLIGRAM(S): 8 TABLET, FILM COATED ORAL at 17:14

## 2020-11-07 RX ADMIN — Medication 100 MILLIGRAM(S): at 17:13

## 2020-11-07 NOTE — PROGRESS NOTE ADULT - SUBJECTIVE AND OBJECTIVE BOX
HPI:  65 year old female with h/o known mitral valve prolapse who c/o worsening SOB with exertion and fatigue.   state they have been monitoring the valve throughout the years.  He reports on the most recent echo revealed moderated to severe MR.   She is now schedule for surgical repair.   Pt currently very anxious and a poor historian  in exam room assisted with history.    (30 Oct 2020 09:54)      11/7 seen and examined bedside. comfortable, complaining of midsternal pain, treated with IV pain medication. Admits to episodic nausea. Denies SOB, dizziness, HA.    Brief summary:  65yFemale POD# 1 MV repair    Overnight events: None.      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          aspirin enteric coated 325 milliGRAM(s) Oral daily  cefuroxime  IVPB 1500 milliGRAM(s) IV Intermittent every 8 hours  chlorhexidine 0.12% Liquid 5 milliLiter(s) Oral Mucosa two times a day  dexMEDEtomidine Infusion 0.2 MICROgram(s)/kG/Hr IV Continuous <Continuous>  fentaNYL    Injectable 50 MICROGram(s) IV Push every 30 minutes PRN  insulin regular Infusion 2 Unit(s)/Hr IV Continuous <Continuous>  norepinephrine Infusion 0.05 MICROgram(s)/kG/Min IV Continuous <Continuous>  pantoprazole    Tablet 40 milliGRAM(s) Oral daily  polyethylene glycol 3350 17 Gram(s) Oral daily  potassium chloride  10 mEq/50 mL IVPB 10 milliEquivalent(s) IV Intermittent every 1 hour  potassium chloride  10 mEq/50 mL IVPB 10 milliEquivalent(s) IV Intermittent every 1 hour  potassium chloride  10 mEq/50 mL IVPB 10 milliEquivalent(s) IV Intermittent every 1 hour  propofol Infusion 5 MICROgram(s)/kG/Min IV Continuous <Continuous>  sodium chloride 0.9%. 1000 milliLiter(s) IV Continuous <Continuous>  sodium chloride 0.9%. 1000 milliLiter(s) IV Continuous <Continuous>  vancomycin  IVPB 1000 milliGRAM(s) IV Intermittent every 12 hours  MEDICATIONS  (PRN):  fentaNYL    Injectable 50 MICROGram(s) IV Push every 30 minutes PRN Moderate Pain (4 - 6)    Height (cm): 167.6 (11-06 @ 07:59)  Weight (kg): 63.5 (11-06 @ 07:59)  BMI (kg/m2): 22.6 (11-06 @ 07:59)  BSA (m2): 1.72 (11-06 @ 07:59)Mode: CPAP with PS, FiO2: 40, PEEP: 5, PS: 5, MAP: 8  Daily Height in cm: 167.64 (06 Nov 2020 07:59)    Daily       ABG - ( 06 Nov 2020 20:57 )  pH, Arterial: 7.37  pH, Blood: x     /  pCO2: 39    /  pO2: 175   / HCO3: 22    / Base Excess: -2.5  /  SaO2: 100                                     8.4    8.10  )-----------( 148      ( 07 Nov 2020 03:06 )             24.7   11-06    140  |  106  |  11.0  ----------------------------<  163<H>  4.2   |  23.0  |  0.44<L>    Ca    8.2<L>      06 Nov 2020 14:30  Mg     2.2     11-06    TPro  4.8<L>  /  Alb  3.3  /  TBili  0.7  /  DBili  x   /  AST  44<H>  /  ALT  11  /  AlkPhos  41  11-06      PT/INR - ( 07 Nov 2020 03:06 )   PT: 14.3 sec;   INR: 1.25 ratio         PTT - ( 07 Nov 2020 03:06 )  PTT:27.6 sec      Objective:  T(C): 37.2 (11-07-20 @ 03:00), Max: 37.4 (11-06-20 @ 22:00)  HR: 82 (11-07-20 @ 03:15) (82 - 102)  BP: 128/75 (11-06-20 @ 07:59) (128/75 - 128/75)  RR: 15 (11-07-20 @ 03:15) (10 - 30)  SpO2: 100% (11-07-20 @ 03:15) (95% - 100%)  Wt(kg): --CAPILLARY BLOOD GLUCOSE      POCT Blood Glucose.: 132 mg/dL (07 Nov 2020 02:10)  POCT Blood Glucose.: 150 mg/dL (06 Nov 2020 23:57)  POCT Blood Glucose.: 167 mg/dL (06 Nov 2020 18:43)  POCT Blood Glucose.: 165 mg/dL (06 Nov 2020 18:03)  POCT Blood Glucose.: 165 mg/dL (06 Nov 2020 16:47)  POCT Blood Glucose.: 186 mg/dL (06 Nov 2020 15:18)  I&O's Summary    06 Nov 2020 07:01  -  07 Nov 2020 03:38  --------------------------------------------------------  IN: 2155.3 mL / OUT: 2045 mL / NET: 110.3 mL        Physical Exam  Neuro: A+O x 3, non-focal, speech clear and intact  Pulm: CTA, equal bilaterally  CV: RRR, +S1S2, no m/r/g  Abd: soft, NT, ND, +BS  Ext: +DP Pulses b/l,  no edema  Inc: MSI C/D/I/stable w/ dressing  Chest tubes: Chest tubes draining appropriately, no signs of air leak. dressing c/d/i, no signs of bleeding.    Imaging:  CXR: < from: Xray Chest 1 View AP/PA. (11.06.20 @ 14:18) >   EXAM:  XR CHEST AP OR PA 1V                          PROCEDURE DATE:  11/06/2020          INTERPRETATION:  TECHNIQUE: Single portable view of the chest.    COMPARISON: 10/30/2020    CLINICAL HISTORY: OR CASE    FINDINGS:    Single frontal view of the chest demonstrates the patient is status post open heart surgery. No congestive changes. The cardiomediastinal silhouette is enlarged. No acute osseous abnormalities. Overlying EKG leads and wires are noted    IMPRESSION: Status post open heart surgery.    < end of copied text >

## 2020-11-08 LAB
ANION GAP SERPL CALC-SCNC: 12 MMOL/L — SIGNIFICANT CHANGE UP (ref 5–17)
BUN SERPL-MCNC: 16 MG/DL — SIGNIFICANT CHANGE UP (ref 8–20)
CALCIUM SERPL-MCNC: 8.4 MG/DL — LOW (ref 8.6–10.2)
CHLORIDE SERPL-SCNC: 105 MMOL/L — SIGNIFICANT CHANGE UP (ref 98–107)
CO2 SERPL-SCNC: 23 MMOL/L — SIGNIFICANT CHANGE UP (ref 22–29)
CREAT SERPL-MCNC: 0.49 MG/DL — LOW (ref 0.5–1.3)
GLUCOSE BLDC GLUCOMTR-MCNC: 121 MG/DL — HIGH (ref 70–99)
GLUCOSE BLDC GLUCOMTR-MCNC: 128 MG/DL — HIGH (ref 70–99)
GLUCOSE BLDC GLUCOMTR-MCNC: 133 MG/DL — HIGH (ref 70–99)
GLUCOSE SERPL-MCNC: 123 MG/DL — HIGH (ref 70–99)
HCT VFR BLD CALC: 28.7 % — LOW (ref 34.5–45)
HGB BLD-MCNC: 9.7 G/DL — LOW (ref 11.5–15.5)
MAGNESIUM SERPL-MCNC: 2 MG/DL — SIGNIFICANT CHANGE UP (ref 1.8–2.6)
MCHC RBC-ENTMCNC: 30.6 PG — SIGNIFICANT CHANGE UP (ref 27–34)
MCHC RBC-ENTMCNC: 33.8 GM/DL — SIGNIFICANT CHANGE UP (ref 32–36)
MCV RBC AUTO: 90.5 FL — SIGNIFICANT CHANGE UP (ref 80–100)
PLATELET # BLD AUTO: 114 K/UL — LOW (ref 150–400)
POTASSIUM SERPL-MCNC: 4.4 MMOL/L — SIGNIFICANT CHANGE UP (ref 3.5–5.3)
POTASSIUM SERPL-SCNC: 4.4 MMOL/L — SIGNIFICANT CHANGE UP (ref 3.5–5.3)
RBC # BLD: 3.17 M/UL — LOW (ref 3.8–5.2)
RBC # FLD: 13.9 % — SIGNIFICANT CHANGE UP (ref 10.3–14.5)
SODIUM SERPL-SCNC: 140 MMOL/L — SIGNIFICANT CHANGE UP (ref 135–145)
WBC # BLD: 9.04 K/UL — SIGNIFICANT CHANGE UP (ref 3.8–10.5)
WBC # FLD AUTO: 9.04 K/UL — SIGNIFICANT CHANGE UP (ref 3.8–10.5)

## 2020-11-08 PROCEDURE — 93010 ELECTROCARDIOGRAM REPORT: CPT

## 2020-11-08 PROCEDURE — 71045 X-RAY EXAM CHEST 1 VIEW: CPT | Mod: 26

## 2020-11-08 RX ORDER — FUROSEMIDE 40 MG
20 TABLET ORAL ONCE
Refills: 0 | Status: COMPLETED | OUTPATIENT
Start: 2020-11-08 | End: 2020-11-08

## 2020-11-08 RX ORDER — FOLIC ACID 0.8 MG
1 TABLET ORAL DAILY
Refills: 0 | Status: DISCONTINUED | OUTPATIENT
Start: 2020-11-08 | End: 2020-11-11

## 2020-11-08 RX ORDER — ENOXAPARIN SODIUM 100 MG/ML
40 INJECTION SUBCUTANEOUS DAILY
Refills: 0 | Status: DISCONTINUED | OUTPATIENT
Start: 2020-11-08 | End: 2020-11-11

## 2020-11-08 RX ORDER — METOPROLOL TARTRATE 50 MG
12.5 TABLET ORAL
Refills: 0 | Status: DISCONTINUED | OUTPATIENT
Start: 2020-11-08 | End: 2020-11-09

## 2020-11-08 RX ORDER — FUROSEMIDE 40 MG
40 TABLET ORAL DAILY
Refills: 0 | Status: DISCONTINUED | OUTPATIENT
Start: 2020-11-09 | End: 2020-11-09

## 2020-11-08 RX ORDER — ACETAMINOPHEN 500 MG
1000 TABLET ORAL ONCE
Refills: 0 | Status: COMPLETED | OUTPATIENT
Start: 2020-11-08 | End: 2020-11-08

## 2020-11-08 RX ORDER — FERROUS SULFATE 325(65) MG
325 TABLET ORAL DAILY
Refills: 0 | Status: DISCONTINUED | OUTPATIENT
Start: 2020-11-08 | End: 2020-11-11

## 2020-11-08 RX ORDER — ASCORBIC ACID 60 MG
500 TABLET,CHEWABLE ORAL DAILY
Refills: 0 | Status: DISCONTINUED | OUTPATIENT
Start: 2020-11-08 | End: 2020-11-11

## 2020-11-08 RX ADMIN — Medication 100 MILLIGRAM(S): at 12:23

## 2020-11-08 RX ADMIN — POLYETHYLENE GLYCOL 3350 17 GRAM(S): 17 POWDER, FOR SOLUTION ORAL at 12:22

## 2020-11-08 RX ADMIN — Medication 325 MILLIGRAM(S): at 12:23

## 2020-11-08 RX ADMIN — Medication 100 MILLIGRAM(S): at 01:38

## 2020-11-08 RX ADMIN — OXYCODONE HYDROCHLORIDE 5 MILLIGRAM(S): 5 TABLET ORAL at 09:30

## 2020-11-08 RX ADMIN — Medication 1 DROP(S): at 05:34

## 2020-11-08 RX ADMIN — Medication 1000 MILLIGRAM(S): at 06:00

## 2020-11-08 RX ADMIN — PANTOPRAZOLE SODIUM 40 MILLIGRAM(S): 20 TABLET, DELAYED RELEASE ORAL at 12:23

## 2020-11-08 RX ADMIN — Medication 20 MILLIGRAM(S): at 08:46

## 2020-11-08 RX ADMIN — ENOXAPARIN SODIUM 40 MILLIGRAM(S): 100 INJECTION SUBCUTANEOUS at 16:40

## 2020-11-08 RX ADMIN — Medication 1 DROP(S): at 21:15

## 2020-11-08 RX ADMIN — Medication 1 DROP(S): at 16:40

## 2020-11-08 RX ADMIN — Medication 12.5 MILLIGRAM(S): at 21:15

## 2020-11-08 RX ADMIN — Medication 250 MILLIGRAM(S): at 07:25

## 2020-11-08 RX ADMIN — SENNA PLUS 2 TABLET(S): 8.6 TABLET ORAL at 21:15

## 2020-11-08 RX ADMIN — Medication 400 MILLIGRAM(S): at 05:45

## 2020-11-08 RX ADMIN — OXYCODONE HYDROCHLORIDE 5 MILLIGRAM(S): 5 TABLET ORAL at 08:45

## 2020-11-08 NOTE — PROGRESS NOTE ADULT - SUBJECTIVE AND OBJECTIVE BOX
Subjective:  64yo F resting comfortable, no c/o pain.      HPI:  65 year old female with h/o known mitral valve prolapse who c/o worsening SOB with exertion and fatigue.   state they have been monitoring the valve throughout the years.  He reports on the most recent echo revealed moderated to severe MR.   She is now schedule for surgical repair.   Pt currently very anxious and a poor historian  in exam room assisted with history.    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 1000 milliLiter(s) (5 mL/Hr) IV Continuous <Continuous>  sodium chloride 0.9%. 1000 milliLiter(s) (10 mL/Hr) IV Continuous <Continuous>  vancomycin  IVPB 1000 milliGRAM(s) IV Intermittent every 12 hours    MEDICATIONS  (PRN):  oxyCODONE    IR 5 milliGRAM(s) Oral every 6 hours PRN Mild Pain (1 - 3)  oxyCODONE    IR 10 milliGRAM(s) Oral every 6 hours PRN Moderate Pain (4 - 6)          Allergies    No Known Allergies    Intolerances          WEIGHTS:  Daily     Daily Weight in k.7 (2020 06:31)  Admit Wt: Drug Dosing Weight  Height (cm): 167.6 (2020 03:37)  Weight (kg): 63.5 (2020 03:37)  BMI (kg/m2): 22.6 (2020 03:37)  BSA (m2): 1.72 (2020 03:37)  I&O's Summary    2020 07:01  -  2020 07:00  --------------------------------------------------------  IN: 2872.8 mL / OUT: 2355 mL / NET: 517.8 mL    2020 07:01  -  2020 04:30  --------------------------------------------------------  IN: 765 mL / OUT: 1755 mL / NET: -990 mL        VITAL SIGNS:  ICU Vital Signs Last 24 Hrs  T(C): 37.1 (2020 00:06), Max: 37.3 (2020 05:00)  T(F): 98.7 (2020 00:06), Max: 99.1 (2020 05:00)  HR: 80 (2020 04:00) (74 - 81)  BP: --  BP(mean): --  ABP: 134/66 (2020 04:00) (96/59 - 146/73)  ABP(mean): 90 (2020 04:00) (73 - 97)  RR: 19 (:00) (10 - 27)  SpO2: 96% (2020 04:00) (96% - 100%)        All laboratory results, radiology and medications reviewed.    LABS:      140  |  105  |  16.0  ----------------------------<  123<H>  4.4   |  23.0  |  0.49<L>    Ca    8.4<L>      2020 03:36  Mg     2.0     08    TPro  5.3<L>  /  Alb  3.9  /  TBili  0.9  /  DBili  x   /  AST  31  /  ALT  12  /  AlkPhos  30<L>  07                                 9.7    9.04  )-----------( 114      ( 2020 03:36 )             28.7          PT/INR - ( 2020 03:06 )   PT: 14.3 sec;   INR: 1.25 ratio         PTT - ( 2020 03:06 )  PTT:27.6 sec  Bilirubin Total, Serum: 0.9 mg/dL ( @ 14:43)    ABG - ( 2020 04:07 )  pH, Arterial: 7.36  pH, Blood: x     /  pCO2: 44    /  pO2: 198   / HCO3: 24    / Base Excess: -0.9  /  SaO2: 100                   CAPILLARY BLOOD GLUCOSE      POCT Blood Glucose.: 120 mg/dL (2020 21:45)  POCT Blood Glucose.: 120 mg/dL (2020 17:22)  POCT Blood Glucose.: 127 mg/dL (2020 12:39)  POCT Blood Glucose.: 124 mg/dL (2020 11:26)  POCT Blood Glucose.: 92 mg/dL (2020 10:20)  POCT Blood Glucose.: 111 mg/dL (2020 08:34)  POCT Blood Glucose.: 122 mg/dL (2020 06:02)  POCT Blood Glucose.: 118 mg/dL (2020 05:04)             PHYSICAL EXAM:  General:  well nourished, no acute distress  Neurology:  alert and oriented X 4, nonfocal, no gross deficits  Respiratory:  clear to auscultation bilaterally  CV:  regular rate and rhythm, normal S1 S2  Abdomen:  soft, nontender, nondistended, positive bowel sounds  Extremities:  warm, well perfused, no edema +DP pulses  Incisions:  midline sternal incision, c/d/i, sternum stable

## 2020-11-08 NOTE — DIETITIAN INITIAL EVALUATION ADULT. - PERTINENT LABORATORY DATA
11-08 Na140 mmol/L Glu 123 mg/dL<H> K+ 4.4 mmol/L Cr  0.49 mg/dL<L> BUN 16.0 mg/dL Phos n/a   Alb n/a   PAB n/a

## 2020-11-08 NOTE — DIETITIAN INITIAL EVALUATION ADULT. - OTHER INFO
65 year old female with h/o known mitral valve prolapse who c/o worsening SOB with exertion and fatigue.   state they have been monitoring the valve throughout the years.  He reports on the most recent echo revealed moderated to severe MR. Now POD2 MV repair with Dr. De Dios. Pt sleeping soundly during assessment, noted to be lethargic and very anxious per documentation. RN reported pt tolerating CLD, possibly advancing today. RN stated pt with lack of appetite. RD to follow up to obtain hx and provide education as feasible.

## 2020-11-09 LAB
ALBUMIN SERPL ELPH-MCNC: 3.8 G/DL — SIGNIFICANT CHANGE UP (ref 3.3–5.2)
ALP SERPL-CCNC: 39 U/L — LOW (ref 40–120)
ALT FLD-CCNC: 19 U/L — SIGNIFICANT CHANGE UP
ANION GAP SERPL CALC-SCNC: 8 MMOL/L — SIGNIFICANT CHANGE UP (ref 5–17)
AST SERPL-CCNC: 25 U/L — SIGNIFICANT CHANGE UP
BILIRUB SERPL-MCNC: 0.7 MG/DL — SIGNIFICANT CHANGE UP (ref 0.4–2)
BUN SERPL-MCNC: 17 MG/DL — SIGNIFICANT CHANGE UP (ref 8–20)
CALCIUM SERPL-MCNC: 8.6 MG/DL — SIGNIFICANT CHANGE UP (ref 8.6–10.2)
CHLORIDE SERPL-SCNC: 102 MMOL/L — SIGNIFICANT CHANGE UP (ref 98–107)
CO2 SERPL-SCNC: 29 MMOL/L — SIGNIFICANT CHANGE UP (ref 22–29)
CREAT SERPL-MCNC: 0.52 MG/DL — SIGNIFICANT CHANGE UP (ref 0.5–1.3)
GLUCOSE BLDC GLUCOMTR-MCNC: 132 MG/DL — HIGH (ref 70–99)
GLUCOSE SERPL-MCNC: 111 MG/DL — HIGH (ref 70–99)
HCT VFR BLD CALC: 27.8 % — LOW (ref 34.5–45)
HGB BLD-MCNC: 9.2 G/DL — LOW (ref 11.5–15.5)
MAGNESIUM SERPL-MCNC: 1.9 MG/DL — SIGNIFICANT CHANGE UP (ref 1.6–2.6)
MCHC RBC-ENTMCNC: 30.4 PG — SIGNIFICANT CHANGE UP (ref 27–34)
MCHC RBC-ENTMCNC: 33.1 GM/DL — SIGNIFICANT CHANGE UP (ref 32–36)
MCV RBC AUTO: 91.7 FL — SIGNIFICANT CHANGE UP (ref 80–100)
PLATELET # BLD AUTO: 128 K/UL — LOW (ref 150–400)
POTASSIUM SERPL-MCNC: 4.1 MMOL/L — SIGNIFICANT CHANGE UP (ref 3.5–5.3)
POTASSIUM SERPL-SCNC: 4.1 MMOL/L — SIGNIFICANT CHANGE UP (ref 3.5–5.3)
PROT SERPL-MCNC: 5.5 G/DL — LOW (ref 6.6–8.7)
RBC # BLD: 3.03 M/UL — LOW (ref 3.8–5.2)
RBC # FLD: 13.3 % — SIGNIFICANT CHANGE UP (ref 10.3–14.5)
SODIUM SERPL-SCNC: 139 MMOL/L — SIGNIFICANT CHANGE UP (ref 135–145)
WBC # BLD: 8.34 K/UL — SIGNIFICANT CHANGE UP (ref 3.8–10.5)
WBC # FLD AUTO: 8.34 K/UL — SIGNIFICANT CHANGE UP (ref 3.8–10.5)

## 2020-11-09 PROCEDURE — 71045 X-RAY EXAM CHEST 1 VIEW: CPT | Mod: 26

## 2020-11-09 RX ORDER — ACETAMINOPHEN 500 MG
650 TABLET ORAL EVERY 6 HOURS
Refills: 0 | Status: DISCONTINUED | OUTPATIENT
Start: 2020-11-09 | End: 2020-11-11

## 2020-11-09 RX ORDER — METOPROLOL TARTRATE 50 MG
25 TABLET ORAL
Refills: 0 | Status: DISCONTINUED | OUTPATIENT
Start: 2020-11-10 | End: 2020-11-11

## 2020-11-09 RX ORDER — MAGNESIUM SULFATE 500 MG/ML
2 VIAL (ML) INJECTION ONCE
Refills: 0 | Status: COMPLETED | OUTPATIENT
Start: 2020-11-09 | End: 2020-11-09

## 2020-11-09 RX ORDER — METOPROLOL TARTRATE 50 MG
12.5 TABLET ORAL ONCE
Refills: 0 | Status: COMPLETED | OUTPATIENT
Start: 2020-11-09 | End: 2020-11-09

## 2020-11-09 RX ORDER — FUROSEMIDE 40 MG
40 TABLET ORAL
Refills: 0 | Status: DISCONTINUED | OUTPATIENT
Start: 2020-11-09 | End: 2020-11-11

## 2020-11-09 RX ORDER — METOPROLOL TARTRATE 50 MG
25 TABLET ORAL
Refills: 0 | Status: DISCONTINUED | OUTPATIENT
Start: 2020-11-09 | End: 2020-11-09

## 2020-11-09 RX ORDER — OXYCODONE HYDROCHLORIDE 5 MG/1
5 TABLET ORAL EVERY 4 HOURS
Refills: 0 | Status: DISCONTINUED | OUTPATIENT
Start: 2020-11-09 | End: 2020-11-09

## 2020-11-09 RX ADMIN — Medication 1 DROP(S): at 21:19

## 2020-11-09 RX ADMIN — Medication 650 MILLIGRAM(S): at 10:13

## 2020-11-09 RX ADMIN — Medication 650 MILLIGRAM(S): at 15:51

## 2020-11-09 RX ADMIN — PANTOPRAZOLE SODIUM 40 MILLIGRAM(S): 20 TABLET, DELAYED RELEASE ORAL at 08:14

## 2020-11-09 RX ADMIN — Medication 1 MILLIGRAM(S): at 08:14

## 2020-11-09 RX ADMIN — Medication 40 MILLIGRAM(S): at 15:51

## 2020-11-09 RX ADMIN — SENNA PLUS 2 TABLET(S): 8.6 TABLET ORAL at 21:19

## 2020-11-09 RX ADMIN — Medication 325 MILLIGRAM(S): at 08:14

## 2020-11-09 RX ADMIN — Medication 1 DROP(S): at 05:30

## 2020-11-09 RX ADMIN — Medication 12.5 MILLIGRAM(S): at 16:50

## 2020-11-09 RX ADMIN — Medication 650 MILLIGRAM(S): at 11:14

## 2020-11-09 RX ADMIN — Medication 12.5 MILLIGRAM(S): at 05:29

## 2020-11-09 RX ADMIN — Medication 40 MILLIGRAM(S): at 05:29

## 2020-11-09 RX ADMIN — POLYETHYLENE GLYCOL 3350 17 GRAM(S): 17 POWDER, FOR SOLUTION ORAL at 08:14

## 2020-11-09 RX ADMIN — Medication 12.5 MILLIGRAM(S): at 18:50

## 2020-11-09 RX ADMIN — ENOXAPARIN SODIUM 40 MILLIGRAM(S): 100 INJECTION SUBCUTANEOUS at 21:19

## 2020-11-09 RX ADMIN — Medication 500 MILLIGRAM(S): at 08:14

## 2020-11-09 RX ADMIN — Medication 50 GRAM(S): at 10:12

## 2020-11-09 NOTE — CHART NOTE - NSCHARTNOTEFT_GEN_A_CORE
Briefly   65 year old female PMH known mitral valve prolapse with worsening SOB with exertion and fatigue. s/p MV repair 11/6 , now POD3 MV repair with Dr. De Dios.   Pt having intermittent confusion, easily reoriented.  request to come visit to assist in reorientation.   US of right     Patient denies acute pain with radiating or aggravating factors.  She denies chest pain, shortness of breath, palpitations, headache, dizziness, nausea, or vomiting.          Subjective:    VITAL SIGNS  Vital Signs Last 24 Hrs  T(C): 36.6 (11-09-20 @ 08:00), Max: 37.1 (11-08-20 @ 21:01)  T(F): 97.8 (11-09-20 @ 08:00), Max: 98.8 (11-08-20 @ 21:01)  HR: 78 (11-09-20 @ 08:00) (74 - 85)  BP: 116/74 (11-09-20 @ 08:00) (116/74 - 145/84)  RR: 18 (11-09-20 @ 08:00) (18 - 22)  SpO2: 96% (11-09-20 @ 08:00) (94% - 98%)  on (O2)              MEDICATIONS  acetaminophen   Tablet .. 650 milliGRAM(s) Oral every 6 hours PRN  artificial  tears Solution 1 Drop(s) Both EYES three times a day  ascorbic acid 500 milliGRAM(s) Oral daily  aspirin enteric coated 325 milliGRAM(s) Oral daily  enoxaparin Injectable 40 milliGRAM(s) SubCutaneous daily  ferrous    sulfate 325 milliGRAM(s) Oral daily  folic acid 1 milliGRAM(s) Oral daily  furosemide    Tablet 40 milliGRAM(s) Oral <User Schedule>  metoprolol tartrate 12.5 milliGRAM(s) Oral two times a day  oxyCODONE    IR 5 milliGRAM(s) Oral every 4 hours PRN  pantoprazole    Tablet 40 milliGRAM(s) Oral daily  polyethylene glycol 3350 17 Gram(s) Oral daily  senna 2 Tablet(s) Oral at bedtime      PHYSICAL EXAM  General: well nourished, well developed, no acute distress  Neurology: alert and oriented x 3, intermittent disorientation   Respiratory: clear to auscultation bilaterally  CV: regular rate and rhythm, normal S1, S2  Abdomen: soft, nontender, nondistended, positive bowel sounds   Extremities: warm, well perfused. no edema. + DP pulses    Relevant recent labs and radiology reviewed.    Last CXR:    Preop workup in progress: Briefly   65 year old female PMH known mitral valve prolapse with worsening SOB with exertion and fatigue. s/p MV repair 11/6, now POD3 MV repair with Dr. De Dios.     AMS- Pt having intermittent confusion, easily reoriented.  request to come visit to assist in reorientation.     right atelectasis -US of right chest performed not significant for a tap.   Lasix 40mg PO increased BID    decreased functional mobility- physical therapy recommends home with rolling walker    pacing wires to be pulled 11/10    Electrolytes supplemented for Mg 1.9         Patient denies acute pain with radiating or aggravating factors.  She denies chest pain, shortness of breath, palpitations, headache, dizziness, nausea, or vomiting.          PHYSICAL EXAM  General: well nourished, well developed, no acute distress  Neurology: alert and oriented x 3 except intermittent disorientation   Respiratory: clear to auscultation bilaterally  CV: regular rate and rhythm, normal S1, S2  Abdomen: soft, nontender, nondistended, positive bowel sounds   Extremities: warm, well perfused. no edema. + DP pulses

## 2020-11-09 NOTE — PHYSICAL THERAPY INITIAL EVALUATION ADULT - PERTINENT HX OF CURRENT PROBLEM, REHAB EVAL
65 year old female with h/o known mitral valve prolapse who c/o worsening SOB with exertion and fatigue.

## 2020-11-09 NOTE — PHYSICAL THERAPY INITIAL EVALUATION ADULT - ADDITIONAL COMMENTS
Pt. reports she lives in a house with her  and son and will have one of them at home and able to assist her at all times. Pt. with 2 MEGAN with one rail and 12 inside with one rail. Pt. reports she plans to stay on the first floor. Pt. was independent PTA and does not own DME.

## 2020-11-09 NOTE — PROGRESS NOTE ADULT - SUBJECTIVE AND OBJECTIVE BOX
POD # 3 s/p Mitral Valve Repair (30mm Zaragoza band, P2 butterfly resection)    Patient is a 65y old Female who presents with a chief complaint of " I need a new valve" (08 Nov 2020 10:11)    HPI:  65 year old female with h/o known mitral valve prolapse who c/o worsening SOB with exertion and fatigue.   state they have been monitoring the valve throughout the years.  He reports on the most recent echo revealed moderated to severe MR.   She is now schedule for surgical repair. Pt currently very anxious and a poor historian  in exam room assisted with history. 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 Mildly confused. Easily reoriented. Denies fevers, chills, lightheadedness, dizziness, HA, CP, palpitations, SOB, cough, abdominal pain, N/V, diarrhea, numbness/tingling in extremities, or any other acute complaints.    MEDICATIONS  (STANDING):  artificial  tears Solution 1 Drop(s) Both EYES three times a day  ascorbic acid 500 milliGRAM(s) Oral daily  aspirin enteric coated 325 milliGRAM(s) Oral daily  enoxaparin Injectable 40 milliGRAM(s) SubCutaneous daily  ferrous    sulfate 325 milliGRAM(s) Oral daily  folic acid 1 milliGRAM(s) Oral daily  furosemide    Tablet 40 milliGRAM(s) Oral daily  insulin lispro (ADMELOG) corrective regimen sliding scale   SubCutaneous Before meals and at bedtime  metoprolol tartrate 12.5 milliGRAM(s) Oral two times a day  pantoprazole    Tablet 40 milliGRAM(s) Oral daily  polyethylene glycol 3350 17 Gram(s) Oral daily  senna 2 Tablet(s) Oral at bedtime    MEDICATIONS  (PRN):  oxyCODONE    IR 5 milliGRAM(s) Oral every 6 hours PRN Mild Pain (1 - 3)  oxyCODONE    IR 10 milliGRAM(s) Oral every 6 hours PRN Moderate Pain (4 - 6)    Allergies: No Known Allergies    Vitals   T(C): 37.1 (08 Nov 2020 21:01), Max: 37.1 (08 Nov 2020 21:01)  T(F): 98.8 (08 Nov 2020 21:01), Max: 98.8 (08 Nov 2020 21:01)  HR: 80 (08 Nov 2020 21:01) (74 - 81)  BP: 127/84 (08 Nov 2020 21:01) (116/76 - 127/84)  BP(mean): 76 (08 Nov 2020 17:00) (76 - 80)  ABP: 128/66 (08 Nov 2020 05:00) (128/66 - 134/66)  ABP(mean): 88 (08 Nov 2020 05:00) (86 - 90)  RR: 18 (08 Nov 2020 21:01) (15 - 22)  SpO2: 94% (08 Nov 2020 21:01) (94% - 99%)    I&O's Detail    07 Nov 2020 07:01  -  08 Nov 2020 07:00  --------------------------------------------------------  IN:    IV PiggyBack: 500 mL    IV PiggyBack: 100 mL    IV PiggyBack: 100 mL    sodium chloride 0.9%: 105 mL    sodium chloride 0.9%: 210 mL  Total IN: 1015 mL    OUT:    Chest Tube (mL): 310 mL    Indwelling Catheter - Urethral (mL): 1515 mL  Total OUT: 1825 mL    Total NET: -810 mL      08 Nov 2020 07:01  -  09 Nov 2020 02:22  --------------------------------------------------------  IN:    Oral Fluid: 680 mL  Total IN: 680 mL    OUT:    Voided (mL): 850 mL  Total OUT: 850 mL    Total NET: -170 mL    Physical Exam  Neuro: A+O x 3, non-focal, speech clear and intact, mildly confused during conversation but easily reoriented. CN II-VII intact.   HEENT:  NCAT, PERRL, EOMI. No conjuctival edema or icterus, no thrush.    Neck:  Supple, trachea midline  Pulm: CTA, good air entry, equal bilaterally, no rales/rhonchi/wheezing, no accessory muscle use noted  Chest: +PW (settings: VVI, rate: 45, mA: 20, sensitivity: 0.8)  CV: regular rate, regular rhythm, +S1S2, no murmur or rub noted  Abd: soft, NT, ND, + BS  Ext: SAN x 4, +1 LE pitting edema, no cyanosis or clubbing, distal motor/neuro/circ intact and equal b/l  Skin: warm, dry, well perfused  Incisions: midsternal incision with mepilex dressing C/D/I, sternum stable, no click appreciated    LABS                        9.7    9.04  )-----------( 114      ( 08 Nov 2020 03:36 )             28.7     11-08    140  |  105  |  16.0  ----------------------------<  123<H>  4.4   |  23.0  |  0.49<L>    Ca    8.4<L>      08 Nov 2020 03:36  Mg     2.0     11-08    TPro  5.3<L>  /  Alb  3.9  /  TBili  0.9  /  DBili  x   /  AST  31  /  ALT  12  /  AlkPhos  30<L>  11-07    PT/INR - ( 07 Nov 2020 03:06 )   PT: 14.3 sec;   INR: 1.25 ratio       PTT - ( 07 Nov 2020 03:06 )  PTT:27.6 sec    POCT Blood Glucose.: 133 mg/dL (11-08-20 @ 21:43)  POCT Blood Glucose.: 128 mg/dL (11-08-20 @ 16:35)  POCT Blood Glucose.: 121 mg/dL (11-08-20 @ 08:08)      Last CXR:  < from: Xray Chest 1 View- PORTABLE-Routine (11.08.20 @ 06:03) >  Right internal jugular line reidentified unchanged in position. Increase in bibasilar airspace disease and hazy effusions likely reflecting progressive fluid overload/CHF. Correlate clinically for concomitant infection. No pneumothorax or other interval change.  IMPRESSION: Increase in bibasilar airspace disease and pleural effusions.  < end of copied text >     POD # 3 s/p Mitral Valve Repair (30mm Zaragoza band, P2 butterfly resection)    Patient is a 65y old Female who presents with a chief complaint of " I need a new valve" (08 Nov 2020 10:11)    HPI:  65 year old female with h/o known mitral valve prolapse who c/o worsening SOB with exertion and fatigue.   state they have been monitoring the valve throughout the years.  He reports on the most recent echo revealed moderated to severe MR.   She is now schedule for surgical repair. Pt currently very anxious and a poor historian  in exam room assisted with history. 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 Mildly confused. Easily reoriented. Denies fevers, chills, lightheadedness, dizziness, HA, CP, palpitations, SOB, cough, abdominal pain, N/V, diarrhea, numbness/tingling in extremities, or any other acute complaints.    MEDICATIONS  (STANDING):  artificial  tears Solution 1 Drop(s) Both EYES three times a day  ascorbic acid 500 milliGRAM(s) Oral daily  aspirin enteric coated 325 milliGRAM(s) Oral daily  enoxaparin Injectable 40 milliGRAM(s) SubCutaneous daily  ferrous    sulfate 325 milliGRAM(s) Oral daily  folic acid 1 milliGRAM(s) Oral daily  furosemide    Tablet 40 milliGRAM(s) Oral daily  insulin lispro (ADMELOG) corrective regimen sliding scale   SubCutaneous Before meals and at bedtime  metoprolol tartrate 12.5 milliGRAM(s) Oral two times a day  pantoprazole    Tablet 40 milliGRAM(s) Oral daily  polyethylene glycol 3350 17 Gram(s) Oral daily  senna 2 Tablet(s) Oral at bedtime    MEDICATIONS  (PRN):  oxyCODONE    IR 5 milliGRAM(s) Oral every 6 hours PRN Mild Pain (1 - 3)  oxyCODONE    IR 10 milliGRAM(s) Oral every 6 hours PRN Moderate Pain (4 - 6)    Allergies: No Known Allergies    Vitals   T(C): 37.1 (08 Nov 2020 21:01), Max: 37.1 (08 Nov 2020 21:01)  T(F): 98.8 (08 Nov 2020 21:01), Max: 98.8 (08 Nov 2020 21:01)  HR: 80 (08 Nov 2020 21:01) (74 - 81)  BP: 127/84 (08 Nov 2020 21:01) (116/76 - 127/84)  BP(mean): 76 (08 Nov 2020 17:00) (76 - 80)  ABP: 128/66 (08 Nov 2020 05:00) (128/66 - 134/66)  ABP(mean): 88 (08 Nov 2020 05:00) (86 - 90)  RR: 18 (08 Nov 2020 21:01) (15 - 22)  SpO2: 94% (08 Nov 2020 21:01) (94% - 99%)    I&O's Detail    07 Nov 2020 07:01  -  08 Nov 2020 07:00  --------------------------------------------------------  IN:    IV PiggyBack: 500 mL    IV PiggyBack: 100 mL    IV PiggyBack: 100 mL    sodium chloride 0.9%: 105 mL    sodium chloride 0.9%: 210 mL  Total IN: 1015 mL    OUT:    Chest Tube (mL): 310 mL    Indwelling Catheter - Urethral (mL): 1515 mL  Total OUT: 1825 mL    Total NET: -810 mL      08 Nov 2020 07:01  -  09 Nov 2020 02:22  --------------------------------------------------------  IN:    Oral Fluid: 680 mL  Total IN: 680 mL    OUT:    Voided (mL): 850 mL  Total OUT: 850 mL    Total NET: -170 mL    Physical Exam  Neuro: A+O x 3, non-focal, speech clear and intact, mildly confused during conversation but easily reoriented. CN II-VII intact.   HEENT:  NCAT, PERRL, EOMI. No conjuctival edema or icterus, no thrush.    Neck:  Supple, trachea midline  Pulm: Diminished BSs b/l bases, no rales/rhonchi/wheezing, no accessory muscle use noted  Chest: +PW (settings: VVI, rate: 45, mA: 20, sensitivity: 0.8)  CV: regular rate, regular rhythm, +S1S2, no murmur or rub noted  Abd: soft, NT, ND, + BS  Ext: SAN x 4, +1 LE pitting edema, no cyanosis or clubbing, distal motor/neuro/circ intact and equal b/l  Skin: warm, dry, well perfused  Incisions: midsternal incision with mepilex dressing C/D/I, sternum stable, no click appreciated    LABS                        9.7    9.04  )-----------( 114      ( 08 Nov 2020 03:36 )             28.7     11-08    140  |  105  |  16.0  ----------------------------<  123<H>  4.4   |  23.0  |  0.49<L>    Ca    8.4<L>      08 Nov 2020 03:36  Mg     2.0     11-08    TPro  5.3<L>  /  Alb  3.9  /  TBili  0.9  /  DBili  x   /  AST  31  /  ALT  12  /  AlkPhos  30<L>  11-07    PT/INR - ( 07 Nov 2020 03:06 )   PT: 14.3 sec;   INR: 1.25 ratio       PTT - ( 07 Nov 2020 03:06 )  PTT:27.6 sec    POCT Blood Glucose.: 133 mg/dL (11-08-20 @ 21:43)  POCT Blood Glucose.: 128 mg/dL (11-08-20 @ 16:35)  POCT Blood Glucose.: 121 mg/dL (11-08-20 @ 08:08)      Last CXR:  < from: Xray Chest 1 View- PORTABLE-Routine (11.08.20 @ 06:03) >  Right internal jugular line reidentified unchanged in position. Increase in bibasilar airspace disease and hazy effusions likely reflecting progressive fluid overload/CHF. Correlate clinically for concomitant infection. No pneumothorax or other interval change.  IMPRESSION: Increase in bibasilar airspace disease and pleural effusions.  < end of copied text >

## 2020-11-10 ENCOUNTER — TRANSCRIPTION ENCOUNTER (OUTPATIENT)
Age: 65
End: 2020-11-10

## 2020-11-10 ENCOUNTER — NON-APPOINTMENT (OUTPATIENT)
Age: 65
End: 2020-11-10

## 2020-11-10 LAB
ALBUMIN SERPL ELPH-MCNC: 4 G/DL — SIGNIFICANT CHANGE UP (ref 3.3–5.2)
ALP SERPL-CCNC: 59 U/L — SIGNIFICANT CHANGE UP (ref 40–120)
ALT FLD-CCNC: 38 U/L — HIGH
ANION GAP SERPL CALC-SCNC: 12 MMOL/L — SIGNIFICANT CHANGE UP (ref 5–17)
ANION GAP SERPL CALC-SCNC: 12 MMOL/L — SIGNIFICANT CHANGE UP (ref 5–17)
AST SERPL-CCNC: 32 U/L — HIGH
BILIRUB SERPL-MCNC: 0.6 MG/DL — SIGNIFICANT CHANGE UP (ref 0.4–2)
BUN SERPL-MCNC: 15 MG/DL — SIGNIFICANT CHANGE UP (ref 8–20)
BUN SERPL-MCNC: 16 MG/DL — SIGNIFICANT CHANGE UP (ref 8–20)
CALCIUM SERPL-MCNC: 8.6 MG/DL — SIGNIFICANT CHANGE UP (ref 8.6–10.2)
CALCIUM SERPL-MCNC: 8.9 MG/DL — SIGNIFICANT CHANGE UP (ref 8.6–10.2)
CHLORIDE SERPL-SCNC: 96 MMOL/L — LOW (ref 98–107)
CHLORIDE SERPL-SCNC: 98 MMOL/L — SIGNIFICANT CHANGE UP (ref 98–107)
CO2 SERPL-SCNC: 30 MMOL/L — HIGH (ref 22–29)
CO2 SERPL-SCNC: 31 MMOL/L — HIGH (ref 22–29)
CREAT SERPL-MCNC: 0.55 MG/DL — SIGNIFICANT CHANGE UP (ref 0.5–1.3)
CREAT SERPL-MCNC: 0.63 MG/DL — SIGNIFICANT CHANGE UP (ref 0.5–1.3)
GLUCOSE SERPL-MCNC: 102 MG/DL — HIGH (ref 70–99)
GLUCOSE SERPL-MCNC: 105 MG/DL — HIGH (ref 70–99)
HCT VFR BLD CALC: 27.8 % — LOW (ref 34.5–45)
HGB BLD-MCNC: 9.5 G/DL — LOW (ref 11.5–15.5)
MAGNESIUM SERPL-MCNC: 1.9 MG/DL — SIGNIFICANT CHANGE UP (ref 1.6–2.6)
MAGNESIUM SERPL-MCNC: 1.9 MG/DL — SIGNIFICANT CHANGE UP (ref 1.8–2.6)
MCHC RBC-ENTMCNC: 31 PG — SIGNIFICANT CHANGE UP (ref 27–34)
MCHC RBC-ENTMCNC: 34.2 GM/DL — SIGNIFICANT CHANGE UP (ref 32–36)
MCV RBC AUTO: 90.8 FL — SIGNIFICANT CHANGE UP (ref 80–100)
PHOSPHATE SERPL-MCNC: 2.5 MG/DL — SIGNIFICANT CHANGE UP (ref 2.4–4.7)
PLATELET # BLD AUTO: 163 K/UL — SIGNIFICANT CHANGE UP (ref 150–400)
POTASSIUM SERPL-MCNC: 3.2 MMOL/L — LOW (ref 3.5–5.3)
POTASSIUM SERPL-MCNC: 3.8 MMOL/L — SIGNIFICANT CHANGE UP (ref 3.5–5.3)
POTASSIUM SERPL-SCNC: 3.2 MMOL/L — LOW (ref 3.5–5.3)
POTASSIUM SERPL-SCNC: 3.8 MMOL/L — SIGNIFICANT CHANGE UP (ref 3.5–5.3)
PROT SERPL-MCNC: 6.1 G/DL — LOW (ref 6.6–8.7)
RBC # BLD: 3.06 M/UL — LOW (ref 3.8–5.2)
RBC # FLD: 13.2 % — SIGNIFICANT CHANGE UP (ref 10.3–14.5)
SODIUM SERPL-SCNC: 138 MMOL/L — SIGNIFICANT CHANGE UP (ref 135–145)
SODIUM SERPL-SCNC: 141 MMOL/L — SIGNIFICANT CHANGE UP (ref 135–145)
SURGICAL PATHOLOGY STUDY: SIGNIFICANT CHANGE UP
WBC # BLD: 6.29 K/UL — SIGNIFICANT CHANGE UP (ref 3.8–10.5)
WBC # FLD AUTO: 6.29 K/UL — SIGNIFICANT CHANGE UP (ref 3.8–10.5)

## 2020-11-10 PROCEDURE — 71045 X-RAY EXAM CHEST 1 VIEW: CPT | Mod: 26

## 2020-11-10 RX ORDER — POTASSIUM CHLORIDE 20 MEQ
40 PACKET (EA) ORAL ONCE
Refills: 0 | Status: COMPLETED | OUTPATIENT
Start: 2020-11-10 | End: 2020-11-10

## 2020-11-10 RX ORDER — POTASSIUM CHLORIDE 20 MEQ
40 PACKET (EA) ORAL ONCE
Refills: 0 | Status: DISCONTINUED | OUTPATIENT
Start: 2020-11-10 | End: 2020-11-10

## 2020-11-10 RX ORDER — MAGNESIUM SULFATE 500 MG/ML
2 VIAL (ML) INJECTION ONCE
Refills: 0 | Status: COMPLETED | OUTPATIENT
Start: 2020-11-10 | End: 2020-11-10

## 2020-11-10 RX ORDER — POTASSIUM CHLORIDE 20 MEQ
40 PACKET (EA) ORAL DAILY
Refills: 0 | Status: DISCONTINUED | OUTPATIENT
Start: 2020-11-11 | End: 2020-11-11

## 2020-11-10 RX ADMIN — Medication 500 MILLIGRAM(S): at 11:22

## 2020-11-10 RX ADMIN — Medication 50 GRAM(S): at 19:53

## 2020-11-10 RX ADMIN — Medication 325 MILLIGRAM(S): at 11:22

## 2020-11-10 RX ADMIN — Medication 40 MILLIEQUIVALENT(S): at 06:51

## 2020-11-10 RX ADMIN — Medication 650 MILLIGRAM(S): at 08:27

## 2020-11-10 RX ADMIN — Medication 25 MILLIGRAM(S): at 05:13

## 2020-11-10 RX ADMIN — PANTOPRAZOLE SODIUM 40 MILLIGRAM(S): 20 TABLET, DELAYED RELEASE ORAL at 11:22

## 2020-11-10 RX ADMIN — Medication 40 MILLIEQUIVALENT(S): at 11:22

## 2020-11-10 RX ADMIN — Medication 40 MILLIEQUIVALENT(S): at 19:52

## 2020-11-10 RX ADMIN — Medication 1 MILLIGRAM(S): at 11:22

## 2020-11-10 RX ADMIN — ENOXAPARIN SODIUM 40 MILLIGRAM(S): 100 INJECTION SUBCUTANEOUS at 21:04

## 2020-11-10 RX ADMIN — Medication 1 DROP(S): at 21:04

## 2020-11-10 RX ADMIN — Medication 25 MILLIGRAM(S): at 17:16

## 2020-11-10 RX ADMIN — Medication 1 DROP(S): at 05:13

## 2020-11-10 RX ADMIN — Medication 40 MILLIGRAM(S): at 17:16

## 2020-11-10 RX ADMIN — Medication 1 DROP(S): at 17:16

## 2020-11-10 RX ADMIN — Medication 650 MILLIGRAM(S): at 07:57

## 2020-11-10 RX ADMIN — Medication 40 MILLIGRAM(S): at 05:13

## 2020-11-10 NOTE — DISCHARGE NOTE PROVIDER - NSDCMRMEDTOKEN_GEN_ALL_CORE_FT
Multiple Vitamins oral tablet: 1 tab(s) orally once a day  Refresh Dry Eye Therapy ophthalmic solution: 1 drop(s) to each affected eye 4 times a day, As Needed  Vitamin B12 Methylcobalamin 2000 mcg oral capsule: orally once a day   acetaminophen 325 mg oral tablet: 2 tab(s) orally every 6 hours, As needed, Moderate Pain (4 - 6)  ascorbic acid 500 mg oral tablet: 1 tab(s) orally once a day  aspirin 325 mg oral delayed release tablet: 1 tab(s) orally once a day  ferrous sulfate 325 mg (65 mg elemental iron) oral tablet: 1 tab(s) orally once a day  folic acid 1 mg oral tablet: 1 tab(s) orally once a day  furosemide 40 mg oral tablet: 1 tab(s) orally once a day  metoprolol tartrate 25 mg oral tablet: 1 tab(s) orally 2 times a day  Multiple Vitamins oral tablet: 1 tab(s) orally once a day  potassium chloride 20 mEq oral tablet, extended release: 2 tab(s) orally once a day  Refresh Dry Eye Therapy ophthalmic solution: 1 drop(s) to each affected eye 4 times a day, As Needed  Vitamin B12 Methylcobalamin 2000 mcg oral capsule: orally once a day   acetaminophen 325 mg oral tablet: 2 tab(s) orally every 6 hours, As needed, Moderate Pain (4 - 6)  ascorbic acid 500 mg oral tablet: 1 tab(s) orally once a day  aspirin 325 mg oral delayed release tablet: 1 tab(s) orally once a day  DME: Rolling walker  ferrous sulfate 325 mg (65 mg elemental iron) oral tablet: 1 tab(s) orally once a day  folic acid 1 mg oral tablet: 1 tab(s) orally once a day  furosemide 40 mg oral tablet: 1 tab(s) orally once a day  metoprolol tartrate 25 mg oral tablet: 1 tab(s) orally 2 times a day  Multiple Vitamins oral tablet: 1 tab(s) orally once a day  potassium chloride 20 mEq oral tablet, extended release: 2 tab(s) orally once a day  Refresh Dry Eye Therapy ophthalmic solution: 1 drop(s) to each affected eye 4 times a day, As Needed  Vitamin B12 Methylcobalamin 2000 mcg oral capsule: orally once a day

## 2020-11-10 NOTE — DISCHARGE NOTE PROVIDER - CARE PROVIDER_API CALL
Kelvin De Dios  SURGERY  [address] [place] [address]  Phone: [phone]  Fax: [phone]  Follow Up Time:

## 2020-11-10 NOTE — DISCHARGE NOTE PROVIDER - HOSPITAL COURSE
65 year old female with h/o known mitral valve prolapse who c/o worsening SOB with exertion and fatigue with most recent echo revealing moderated to severe MR. Patient underwent Mitral Valve Repair with Dr. De Diso on 11/6/20. Postoperative course significant for orthostatic hypotension in the setting of acute blood loss anemia (requiring blood transfusion), intermittent confusion (easily reoriented, holding opioid pain medications), b/l lower lung atelectasis (not requiring O2 therapy), and decreased functional mobility (PT following, recommending home with rolling walker).

## 2020-11-10 NOTE — DISCHARGE NOTE PROVIDER - NSDCCPCAREPLAN_GEN_ALL_CORE_FT
PRINCIPAL DISCHARGE DIAGNOSIS  Diagnosis: Mitral regurgitation  Assessment and Plan of Treatment: Please follow up with Dr. De Dios on   At [address], [place] [address]  Shower daily, no bathing swimming in a pool or the ocean until instructed by MD.    We advise that you do not drive until instructed by MD.   You may not return to work until instructed by MD.   DO NOT APPLY CREAM POWDER LOTION OR OINTMENT TO ANY SURGICAL WOUND>THIS CAN IMPEDE HEALING AND CAUSE AN INFECTION  Please eat a low fat low salt diet.   Please come to ED or Call Cardio thoracic office at [phone] if Chest pain, Shortness of Breath, persistant Nausea & vomiting, oozing from wounds,palpitations or pain not relieved with medication  Weigh yourself daily>notify surgeons office if  2 lb increase in weight in 24 hours.       PRINCIPAL DISCHARGE DIAGNOSIS  Diagnosis: Mitral regurgitation  Assessment and Plan of Treatment: Please follow up with Dr. De Dios on   At [address] [facility] [address], [place] [address]  Shower daily, no bathing swimming in a pool or the ocean until instructed by MD.    We advise that you do not drive until instructed by MD.   You may not return to work until instructed by MD.   DO NOT APPLY CREAM POWDER LOTION OR OINTMENT TO ANY SURGICAL WOUND>THIS CAN IMPEDE HEALING AND CAUSE AN INFECTION  Please eat a low fat low salt diet.   Please come to ED or Call Cardio thoracic office at [phone] if Chest pain, Shortness of Breath, persistant Nausea & vomiting, oozing from wounds,palpitations or pain not relieved with medication  Weigh yourself daily>notify surgeons office if  2 lb increase in weight in 24 hours.  Please follow up with your cardiologist and PMD in 1-2 weeks.  Call for appointments please.

## 2020-11-10 NOTE — CHART NOTE - NSCHARTNOTEFT_GEN_A_CORE
65 year old female with h/o known mitral valve prolapse who c/o worsening SOB with exertion and fatigue with most recent echo revealing moderated to severe MR. Patient underwent Mitral Valve Repair with Dr. De Dios on 11/6/20. Postoperative course significant for orthostatic hypotension in the setting of acute blood loss anemia (requiring blood transfusion), intermittent confusion (easily reoriented, holding opioid pain medications), b/l lower lung atelectasis (not requiring O2 therapy), and decreased functional mobility (PT following, recommending home with rolling walker).     Plan:  Remove pacing wires today per Dr. De Dios.  1:1 for safety (baseline visual issues s/p cataracts surgery).  Potassium repleted.  Continue PO Lasix BID for post op volume overload.  AM labs and cxr.  Daily potassium added while on lasix.  Discussed with Dr. De Dios.

## 2020-11-10 NOTE — PROGRESS NOTE ADULT - SUBJECTIVE AND OBJECTIVE BOX
POD # 4 s/p Mitral Valve Repair (30mm Zaragoza band, P2 butterfly resection)    HPI:  65 year old female with h/o known mitral valve prolapse who c/o worsening SOB with exertion and fatigue.   state they have been monitoring the valve throughout the years.  He reports on the most recent echo revealed moderated to severe MR.  Pt currently very anxious and a poor historian  in exam room assisted with history. 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 Intermittent confusion. Easily reoriented.  at bedside to help reorient patient. Denies fevers, chills, lightheadedness, dizziness, HA, CP, palpitations, SOB, cough, abdominal pain, N/V, diarrhea, numbness/tingling in extremities, or any other acute complaints.    MEDICATIONS  (STANDING):  artificial  tears Solution 1 Drop(s) Both EYES three times a day  ascorbic acid 500 milliGRAM(s) Oral daily  aspirin enteric coated 325 milliGRAM(s) Oral daily  enoxaparin Injectable 40 milliGRAM(s) SubCutaneous daily  ferrous    sulfate 325 milliGRAM(s) Oral daily  folic acid 1 milliGRAM(s) Oral daily  furosemide    Tablet 40 milliGRAM(s) Oral <User Schedule>  metoprolol tartrate 25 milliGRAM(s) Oral two times a day  pantoprazole    Tablet 40 milliGRAM(s) Oral daily  polyethylene glycol 3350 17 Gram(s) Oral daily  senna 2 Tablet(s) Oral at bedtime    MEDICATIONS  (PRN):  acetaminophen   Tablet .. 650 milliGRAM(s) Oral every 6 hours PRN Moderate Pain (4 - 6)    Allergies: No Known Allergies    Vitals   T(C): 37.3 (09 Nov 2020 21:17), Max: 37.3 (09 Nov 2020 21:17)  T(F): 99.1 (09 Nov 2020 21:17), Max: 99.1 (09 Nov 2020 21:17)  HR: 84 (09 Nov 2020 21:17) (78 - 88)  BP: 116/72 (09 Nov 2020 21:17) (116/72 - 145/84)  BP(mean): 88 (09 Nov 2020 08:00) (88 - 88)  RR: 16 (09 Nov 2020 21:17) (16 - 18)  SpO2: 94% (09 Nov 2020 21:17) (94% - 96%)    I&O's Detail    08 Nov 2020 07:01  -  09 Nov 2020 07:00  --------------------------------------------------------  IN:    Oral Fluid: 680 mL  Total IN: 680 mL    OUT:    Voided (mL): 850 mL  Total OUT: 850 mL    Total NET: -170 mL      09 Nov 2020 07:01  -  10 Nov 2020 03:28  --------------------------------------------------------  IN:    Oral Fluid: 240 mL  Total IN: 240 mL    OUT:    Voided (mL): 1150 mL  Total OUT: 1150 mL    Total NET: -910 mL    Physical Exam  Neuro: A+O x 3, non-focal, speech clear and intact, mildly confused during conversation but easily reoriented. CN II-VII intact.   HEENT:  NCAT, PERRL, EOMI. No conjuctival edema or icterus, no thrush.    Neck:  Supple, trachea midline  Pulm: Diminished BSs b/l bases, no rales/rhonchi/wheezing, no accessory muscle use noted  Chest: +PW (settings: VVI, rate: 45, mA: 20, sensitivity: 0.8)  CV: regular rate, regular rhythm, +S1S2, no murmur or rub noted  Abd: soft, NT, ND, + BS  Ext: SAN x 4, +1 LE pitting edema, no cyanosis or clubbing, distal motor/neuro/circ intact and equal b/l, strength equal b/l  Skin: warm, dry, well perfused  Incisions: midsternal incision with mepilex dressing C/D/I, sternum stable, no click appreciated    LABS                        9.2    8.34  )-----------( 128      ( 09 Nov 2020 05:56 )             27.8     11-09    139  |  102  |  17.0  ----------------------------<  111<H>  4.1   |  29.0  |  0.52    Ca    8.6      09 Nov 2020 05:56  Mg     1.9     11-09    TPro  5.5<L>  /  Alb  3.8  /  TBili  0.7  /  DBili  x   /  AST  25  /  ALT  19  /  AlkPhos  39<L>  11-09    POCT Blood Glucose.: 132 mg/dL (11-09-20 @ 08:09)      Last CXR:  < from: Xray Chest 1 View- PORTABLE-Routine (11.09.20 @ 05:05) >  Frontal expiratory view of the chest shows the heart to be similar in size. The lungs show progression of right pleural effusion with partial clearing of the left base and there is no evidence of pneumothorax. Sternal wires are again noted.  IMPRESSION:  Changing effusions as noted.  < end of copied text >

## 2020-11-10 NOTE — DISCHARGE NOTE PROVIDER - NSDCACTIVITY_GEN_ALL_CORE
Stairs allowed/No heavy lifting/straining/Do not make important decisions/Showering allowed/Walking - Outdoors allowed/Walking - Indoors allowed/Do not drive or operate machinery

## 2020-11-10 NOTE — DISCHARGE NOTE PROVIDER - NSDCCPTREATMENT_GEN_ALL_CORE_FT
PRINCIPAL PROCEDURE  Procedure: Repair, mitral valve, with JAMIN  Findings and Treatment: 30mm Zaragoza band, P2 butterfly resection

## 2020-11-11 ENCOUNTER — TRANSCRIPTION ENCOUNTER (OUTPATIENT)
Age: 65
End: 2020-11-11

## 2020-11-11 VITALS
OXYGEN SATURATION: 94 % | TEMPERATURE: 98 F | SYSTOLIC BLOOD PRESSURE: 113 MMHG | DIASTOLIC BLOOD PRESSURE: 79 MMHG | RESPIRATION RATE: 16 BRPM | HEART RATE: 91 BPM

## 2020-11-11 LAB
ALBUMIN SERPL ELPH-MCNC: 4.3 G/DL — SIGNIFICANT CHANGE UP (ref 3.3–5.2)
ALP SERPL-CCNC: 60 U/L — SIGNIFICANT CHANGE UP (ref 40–120)
ALT FLD-CCNC: 35 U/L — HIGH
ANION GAP SERPL CALC-SCNC: 15 MMOL/L — SIGNIFICANT CHANGE UP (ref 5–17)
AST SERPL-CCNC: 25 U/L — SIGNIFICANT CHANGE UP
BILIRUB SERPL-MCNC: 0.8 MG/DL — SIGNIFICANT CHANGE UP (ref 0.4–2)
BUN SERPL-MCNC: 16 MG/DL — SIGNIFICANT CHANGE UP (ref 8–20)
CALCIUM SERPL-MCNC: 9.3 MG/DL — SIGNIFICANT CHANGE UP (ref 8.6–10.2)
CHLORIDE SERPL-SCNC: 96 MMOL/L — LOW (ref 98–107)
CO2 SERPL-SCNC: 26 MMOL/L — SIGNIFICANT CHANGE UP (ref 22–29)
CREAT SERPL-MCNC: 0.6 MG/DL — SIGNIFICANT CHANGE UP (ref 0.5–1.3)
GLUCOSE SERPL-MCNC: 98 MG/DL — SIGNIFICANT CHANGE UP (ref 70–99)
HCT VFR BLD CALC: 32.9 % — LOW (ref 34.5–45)
HGB BLD-MCNC: 11.1 G/DL — LOW (ref 11.5–15.5)
MAGNESIUM SERPL-MCNC: 2.1 MG/DL — SIGNIFICANT CHANGE UP (ref 1.6–2.6)
MCHC RBC-ENTMCNC: 30.9 PG — SIGNIFICANT CHANGE UP (ref 27–34)
MCHC RBC-ENTMCNC: 33.7 GM/DL — SIGNIFICANT CHANGE UP (ref 32–36)
MCV RBC AUTO: 91.6 FL — SIGNIFICANT CHANGE UP (ref 80–100)
PHOSPHATE SERPL-MCNC: 3.1 MG/DL — SIGNIFICANT CHANGE UP (ref 2.4–4.7)
PLATELET # BLD AUTO: 238 K/UL — SIGNIFICANT CHANGE UP (ref 150–400)
POTASSIUM SERPL-MCNC: 3.7 MMOL/L — SIGNIFICANT CHANGE UP (ref 3.5–5.3)
POTASSIUM SERPL-SCNC: 3.7 MMOL/L — SIGNIFICANT CHANGE UP (ref 3.5–5.3)
PROT SERPL-MCNC: 6.6 G/DL — SIGNIFICANT CHANGE UP (ref 6.6–8.7)
RBC # BLD: 3.59 M/UL — LOW (ref 3.8–5.2)
RBC # FLD: 13.4 % — SIGNIFICANT CHANGE UP (ref 10.3–14.5)
SODIUM SERPL-SCNC: 137 MMOL/L — SIGNIFICANT CHANGE UP (ref 135–145)
WBC # BLD: 6.88 K/UL — SIGNIFICANT CHANGE UP (ref 3.8–10.5)
WBC # FLD AUTO: 6.88 K/UL — SIGNIFICANT CHANGE UP (ref 3.8–10.5)

## 2020-11-11 PROCEDURE — 36430 TRANSFUSION BLD/BLD COMPNT: CPT

## 2020-11-11 PROCEDURE — P9016: CPT

## 2020-11-11 PROCEDURE — 97163 PT EVAL HIGH COMPLEX 45 MIN: CPT

## 2020-11-11 PROCEDURE — C1769: CPT

## 2020-11-11 PROCEDURE — P9045: CPT

## 2020-11-11 PROCEDURE — 82962 GLUCOSE BLOOD TEST: CPT

## 2020-11-11 PROCEDURE — C1889: CPT

## 2020-11-11 PROCEDURE — 97530 THERAPEUTIC ACTIVITIES: CPT

## 2020-11-11 PROCEDURE — 71045 X-RAY EXAM CHEST 1 VIEW: CPT | Mod: 26

## 2020-11-11 PROCEDURE — 85025 COMPLETE CBC W/AUTO DIFF WBC: CPT

## 2020-11-11 PROCEDURE — 83605 ASSAY OF LACTIC ACID: CPT

## 2020-11-11 PROCEDURE — 85610 PROTHROMBIN TIME: CPT

## 2020-11-11 PROCEDURE — 85014 HEMATOCRIT: CPT

## 2020-11-11 PROCEDURE — 84132 ASSAY OF SERUM POTASSIUM: CPT

## 2020-11-11 PROCEDURE — 93325 DOPPLER ECHO COLOR FLOW MAPG: CPT

## 2020-11-11 PROCEDURE — 82330 ASSAY OF CALCIUM: CPT

## 2020-11-11 PROCEDURE — 97110 THERAPEUTIC EXERCISES: CPT

## 2020-11-11 PROCEDURE — 80048 BASIC METABOLIC PNL TOTAL CA: CPT

## 2020-11-11 PROCEDURE — 85027 COMPLETE CBC AUTOMATED: CPT

## 2020-11-11 PROCEDURE — 97116 GAIT TRAINING THERAPY: CPT

## 2020-11-11 PROCEDURE — 82947 ASSAY GLUCOSE BLOOD QUANT: CPT

## 2020-11-11 PROCEDURE — 93010 ELECTROCARDIOGRAM REPORT: CPT

## 2020-11-11 PROCEDURE — 94003 VENT MGMT INPAT SUBQ DAY: CPT

## 2020-11-11 PROCEDURE — 88305 TISSUE EXAM BY PATHOLOGIST: CPT

## 2020-11-11 PROCEDURE — 84100 ASSAY OF PHOSPHORUS: CPT

## 2020-11-11 PROCEDURE — 84295 ASSAY OF SERUM SODIUM: CPT

## 2020-11-11 PROCEDURE — 71045 X-RAY EXAM CHEST 1 VIEW: CPT

## 2020-11-11 PROCEDURE — 93312 ECHO TRANSESOPHAGEAL: CPT

## 2020-11-11 PROCEDURE — C1729: CPT

## 2020-11-11 PROCEDURE — 85018 HEMOGLOBIN: CPT

## 2020-11-11 PROCEDURE — 80053 COMPREHEN METABOLIC PANEL: CPT

## 2020-11-11 PROCEDURE — 82803 BLOOD GASES ANY COMBINATION: CPT

## 2020-11-11 PROCEDURE — C1751: CPT

## 2020-11-11 PROCEDURE — 83735 ASSAY OF MAGNESIUM: CPT

## 2020-11-11 PROCEDURE — 93005 ELECTROCARDIOGRAM TRACING: CPT

## 2020-11-11 PROCEDURE — 93320 DOPPLER ECHO COMPLETE: CPT

## 2020-11-11 PROCEDURE — 82435 ASSAY OF BLOOD CHLORIDE: CPT

## 2020-11-11 PROCEDURE — 36415 COLL VENOUS BLD VENIPUNCTURE: CPT

## 2020-11-11 PROCEDURE — 85730 THROMBOPLASTIN TIME PARTIAL: CPT

## 2020-11-11 RX ORDER — POTASSIUM CHLORIDE 20 MEQ
40 PACKET (EA) ORAL ONCE
Refills: 0 | Status: COMPLETED | OUTPATIENT
Start: 2020-11-11 | End: 2020-11-11

## 2020-11-11 RX ORDER — FOLIC ACID 0.8 MG
1 TABLET ORAL
Qty: 30 | Refills: 0
Start: 2020-11-11 | End: 2020-12-10

## 2020-11-11 RX ORDER — FERROUS SULFATE 325(65) MG
1 TABLET ORAL
Qty: 30 | Refills: 0
Start: 2020-11-11 | End: 2020-12-10

## 2020-11-11 RX ORDER — FUROSEMIDE 40 MG
1 TABLET ORAL
Qty: 14 | Refills: 0
Start: 2020-11-11 | End: 2020-11-24

## 2020-11-11 RX ORDER — ACETAMINOPHEN 500 MG
2 TABLET ORAL
Qty: 0 | Refills: 0 | DISCHARGE
Start: 2020-11-11

## 2020-11-11 RX ORDER — POTASSIUM CHLORIDE 20 MEQ
2 PACKET (EA) ORAL
Qty: 28 | Refills: 0
Start: 2020-11-11 | End: 2020-11-24

## 2020-11-11 RX ORDER — FUROSEMIDE 40 MG
40 TABLET ORAL DAILY
Refills: 0 | Status: DISCONTINUED | OUTPATIENT
Start: 2020-11-12 | End: 2020-11-11

## 2020-11-11 RX ORDER — ASPIRIN/CALCIUM CARB/MAGNESIUM 324 MG
1 TABLET ORAL
Qty: 30 | Refills: 1
Start: 2020-11-11 | End: 2021-01-09

## 2020-11-11 RX ORDER — ASCORBIC ACID 60 MG
1 TABLET,CHEWABLE ORAL
Qty: 30 | Refills: 0
Start: 2020-11-11 | End: 2020-12-10

## 2020-11-11 RX ORDER — METOPROLOL TARTRATE 50 MG
1 TABLET ORAL
Qty: 60 | Refills: 1
Start: 2020-11-11 | End: 2021-01-09

## 2020-11-11 RX ADMIN — Medication 500 MILLIGRAM(S): at 09:54

## 2020-11-11 RX ADMIN — ENOXAPARIN SODIUM 40 MILLIGRAM(S): 100 INJECTION SUBCUTANEOUS at 09:57

## 2020-11-11 RX ADMIN — Medication 325 MILLIGRAM(S): at 09:57

## 2020-11-11 RX ADMIN — Medication 650 MILLIGRAM(S): at 05:48

## 2020-11-11 RX ADMIN — PANTOPRAZOLE SODIUM 40 MILLIGRAM(S): 20 TABLET, DELAYED RELEASE ORAL at 09:54

## 2020-11-11 RX ADMIN — Medication 1 DROP(S): at 05:43

## 2020-11-11 RX ADMIN — Medication 325 MILLIGRAM(S): at 09:54

## 2020-11-11 RX ADMIN — Medication 40 MILLIGRAM(S): at 05:44

## 2020-11-11 RX ADMIN — Medication 1 MILLIGRAM(S): at 09:57

## 2020-11-11 RX ADMIN — Medication 25 MILLIGRAM(S): at 05:44

## 2020-11-11 RX ADMIN — Medication 40 MILLIEQUIVALENT(S): at 09:57

## 2020-11-11 RX ADMIN — Medication 40 MILLIEQUIVALENT(S): at 09:58

## 2020-11-11 RX ADMIN — Medication 650 MILLIGRAM(S): at 05:44

## 2020-11-11 NOTE — PROGRESS NOTE ADULT - PROBLEM SELECTOR PROBLEM 1
Mitral regurgitation

## 2020-11-11 NOTE — DISCHARGE NOTE NURSING/CASE MANAGEMENT/SOCIAL WORK - PATIENT PORTAL LINK FT
You can access the FollowMyHealth Patient Portal offered by [facility] by registering at the following website: [email or web address]. By joining [employer]’s FollowMyHealth portal, you will also be able to view your health information using other applications (apps) compatible with our system.

## 2020-11-11 NOTE — PROGRESS NOTE ADULT - PROBLEM SELECTOR PLAN 2
Pantoprazole for GI prophylaxis.  SCDs, Lovenox for DVT prophylaxis.    Follow up with PT to assess patient later today.   Further plan to be discussed with CT Surgery attending / team on AM rounds.
Pantoprazole for GI prophylaxis.  SCDs, Lovenox for DVT prophylaxis.    PT recommending home with rolling walker. Possible discharge in the next 1-2 days.   Further plan to be discussed with CT Surgery attending / team on AM rounds.
Pantoprazole for GI prophylaxis.  SCDs, Lovenox for DVT prophylaxis.
SCD boots and lovenox for DVT prophylaxis.     Protonix for GI bleeding prophylaxis.    Pacemaker to VVI backup.
Pantoprazole for GI prophylaxis  SCDs, Lovenox for DVT prophylaxis

## 2020-11-11 NOTE — PROGRESS NOTE ADULT - SUBJECTIVE AND OBJECTIVE BOX
Subjective: Pt sitting up in bed.  Denies SOB or CP.  NAD noted.      Tele:    SR                      T(F): 97.8 (20 @ 08:00), Max: 98.6 (20 @ 05:41)  HR: 94 (20 @ 08:00) (87 - 95)  BP: 109/69 (20 @ 08:00) (96/59 - 122/83)  RR: 18 (20 @ 08:00) (17 - 18)  SpO2: 93% (20 @ 08:00) (93% - 100%) on room air.      Daily     Daily Weight in k.1 (2020 04:30)    LV EF: nml    No Known Allergies          137  |  96<L>  |  16.0  ----------------------------<  98  3.7   |  26.0  |  0.60    Ca    9.3      2020 06:42  Phos  3.1       Mg     2.1         TPro  6.6  /  Alb  4.3  /  TBili  0.8  /  DBili  x   /  AST  25  /  ALT  35<H>  /  AlkPhos  60                                 11.1   6.88  )-----------( 238      ( 2020 06:42 )             32.9          CXR: < from: Xray Chest 1 View- PORTABLE-Routine (11.10.20 @ 07:29) >     EXAM:  XR CHEST PORTABLE ROUTINE 1V                          PROCEDURE DATE:  11/10/2020      INTERPRETATION:  TECHNIQUE: Single portable view of the chest.    COMPARISON: 2020    CLINICAL HISTORY: Post-cardiac surgeryPost CTS    FINDINGS:    Single frontal view of the chest demonstrates tiny bilateral pleural effusions, improved. The cardiomediastinal silhouette is enlarged. No acute osseous abnormalities. Overlying EKG leads and wires are noted. Mediastinal sternotomy wires    IMPRESSION: Tiny bilateral pleural effusions, improved    MG DAVID MD; Attending Radiologist  This document has been electronically signed. Nov 10 2020  1:28PM    < end of copied text >      I&O's Detail    10 Nov 2020 07:01  -  2020 07:00  --------------------------------------------------------  IN:    Oral Fluid: 120 mL  Total IN: 120 mL    OUT:    Voided (mL): 500 mL  Total OUT: 500 mL    Total NET: -380 mL      CHEST TUBE:  [ ] YES [x ] NO  OUTPUT:     per 24 hours    AIR LEAKS:  [ ] YES [ ] NO      CLINTON DRAIN:   [ ] YES [x ] NO  OUTPUT:     per 24 hours    EPICARDIAL WIRES:  [ ] YES [x ] NO      BOWEL MOVEMENT:  [x ] YES [ ] NO        Active Medications:  acetaminophen   Tablet .. 650 milliGRAM(s) Oral every 6 hours PRN  artificial  tears Solution 1 Drop(s) Both EYES three times a day  ascorbic acid 500 milliGRAM(s) Oral daily  aspirin enteric coated 325 milliGRAM(s) Oral daily  enoxaparin Injectable 40 milliGRAM(s) SubCutaneous daily  ferrous    sulfate 325 milliGRAM(s) Oral daily  folic acid 1 milliGRAM(s) Oral daily  metoprolol tartrate 25 milliGRAM(s) Oral two times a day  pantoprazole    Tablet 40 milliGRAM(s) Oral daily  polyethylene glycol 3350 17 Gram(s) Oral daily  potassium chloride    Tablet ER 40 milliEquivalent(s) Oral daily  senna 2 Tablet(s) Oral at bedtime      Physical Exam:    Neuro: AAOX3.      Pulm:     CV:     Abd:     Extremities:    Incision(s):                           PAST MEDICAL & SURGICAL HISTORY:  Mitral valve disease    H/O foot surgery  2018 Left foot neuroma               Subjective: Pt sitting up in bed.  Denies SOB or CP.  NAD noted.      Tele:    SR                      T(F): 97.8 (20 @ 08:00), Max: 98.6 (20 @ 05:41)  HR: 94 (20 @ 08:00) (87 - 95)  BP: 109/69 (20 @ 08:00) (96/59 - 122/83)  RR: 18 (20 @ 08:00) (17 - 18)  SpO2: 93% (20 @ 08:00) (93% - 100%) on room air.      Daily     Daily Weight in k.1 (2020 04:30)    LV EF: nml    No Known Allergies          137  |  96<L>  |  16.0  ----------------------------<  98  3.7   |  26.0  |  0.60    Ca    9.3      2020 06:42  Phos  3.1       Mg     2.1         TPro  6.6  /  Alb  4.3  /  TBili  0.8  /  DBili  x   /  AST  25  /  ALT  35<H>  /  AlkPhos  60                                 11.1   6.88  )-----------( 238      ( 2020 06:42 )             32.9          CXR: < from: Xray Chest 1 View- PORTABLE-Routine (11.10.20 @ 07:29) >     EXAM:  XR CHEST PORTABLE ROUTINE 1V                          PROCEDURE DATE:  11/10/2020      INTERPRETATION:  TECHNIQUE: Single portable view of the chest.    COMPARISON: 2020    CLINICAL HISTORY: Post-cardiac surgeryPost CTS    FINDINGS:    Single frontal view of the chest demonstrates tiny bilateral pleural effusions, improved. The cardiomediastinal silhouette is enlarged. No acute osseous abnormalities. Overlying EKG leads and wires are noted. Mediastinal sternotomy wires    IMPRESSION: Tiny bilateral pleural effusions, improved    MG DAVID MD; Attending Radiologist  This document has been electronically signed. Nov 10 2020  1:28PM    < end of copied text >      I&O's Detail    10 Nov 2020 07:01  -  2020 07:00  --------------------------------------------------------  IN:    Oral Fluid: 120 mL  Total IN: 120 mL    OUT:    Voided (mL): 500 mL  Total OUT: 500 mL    Total NET: -380 mL      CHEST TUBE:  [ ] YES [x ] NO  OUTPUT:     per 24 hours    AIR LEAKS:  [ ] YES [ ] NO      CLINTON DRAIN:   [ ] YES [x ] NO  OUTPUT:     per 24 hours    EPICARDIAL WIRES:  [ ] YES [x ] NO      BOWEL MOVEMENT:  [x ] YES [ ] NO        Active Medications:  acetaminophen   Tablet .. 650 milliGRAM(s) Oral every 6 hours PRN  artificial  tears Solution 1 Drop(s) Both EYES three times a day  ascorbic acid 500 milliGRAM(s) Oral daily  aspirin enteric coated 325 milliGRAM(s) Oral daily  enoxaparin Injectable 40 milliGRAM(s) SubCutaneous daily  ferrous    sulfate 325 milliGRAM(s) Oral daily  folic acid 1 milliGRAM(s) Oral daily  metoprolol tartrate 25 milliGRAM(s) Oral two times a day  pantoprazole    Tablet 40 milliGRAM(s) Oral daily  polyethylene glycol 3350 17 Gram(s) Oral daily  potassium chloride    Tablet ER 40 milliEquivalent(s) Oral daily  senna 2 Tablet(s) Oral at bedtime      Physical Exam:    Neuro: AAOX3.  baseline vision issues from prior cataract surgery.    Pulm: Diminished BLL.    CV: RRR.  +S1+S2.    Abd: Soft/NT/ND.  +BS.     Extremities: Mild BLE edema.  +pp.      Incision(s): MSI with mepilex intact.  Sternum stable.                       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

## 2020-11-11 NOTE — PROGRESS NOTE ADULT - ASSESSMENT
65 year old female with h/o known mitral valve prolapse who c/o worsening SOB with exertion and fatigue with most recent echo revealing moderated to severe MR. Patient underwent Mitral Valve Repair with Dr. De Dios on 11/6/20. Postoperative course significant for orthostatic hypotension and acute blood loss anemia requiring blood transfusion.
65 year old female with h/o known mitral valve prolapse who c/o worsening SOB with exertion and fatigue with most recent echo revealing moderated to severe MR. Patient underwent Mitral Valve Repair with Dr. De Dios on 11/6/20. Postoperative course significant for orthostatic hypotension in the setting of acute blood loss anemia (requiring blood transfusion), intermittent confusion (easily reoriented, holding opioid pain medications), b/l lower lung atelectasis (not requiring O2 therapy), and decreased functional mobility (PT following, recommending home with rolling walker).   
65 year old female with h/o known mitral valve prolapse who c/o worsening SOB with exertion and fatigue with most recent echo revealing moderated to severe MR. Patient underwent Mitral Valve Repair with Dr. De Dios on 11/6/20. Postoperative course significant for orthostatic hypotension in the setting of acute blood loss anemia (requiring blood transfusion), intermittent confusion (easily reoriented, holding opioid pain medications), b/l lower lung atelectasis (not requiring O2 therapy), and decreased functional mobility (PT following, recommending home with rolling walker).   
65 year old female with h/o known mitral valve prolapse who c/o worsening SOB with exertion and fatigue.   state they have been monitoring the valve throughout the years.  He reports on the most recent echo revealed moderated to severe MR. Now POD1 MV repair with Dr. De Dios.
65 year old female with h/o known mitral valve prolapse who c/o worsening SOB with exertion and fatigue.   state they have been monitoring the valve throughout the years.  He reports on the most recent echo revealed moderated to severe MR. Now POD2 MV repair with Dr. De Dios.

## 2020-11-11 NOTE — PROGRESS NOTE ADULT - PROBLEM SELECTOR PLAN 1
S/p MV Repair on 11/6.  Hemodynamically stable.   Monitor H/H.  Continue iron, folic acid, and vit C.   Continue Aspirin.  Continue lopressor as tolerated by BP and HR.   Continue lasix po BID.  Supplement electrolytes to maintain K >4 and Mg >2.   Encourage PO intake.  Encourage OOB to chair and ambulation with PT.  Encourage deep breathing exercised and coughing.  Follow up AM CXR. B/l lower lobe atelectasis noted 11/9.   Analgesics PRN for pain.   Continue bowel regimen.
s/p MV Repair on 11/6.  Hemodynamically stable.   Monitor H/H.  Continue iron, folic acid, and vit C.   Continue Aspirin.  Continue lopressor as tolerated by BP and HR.   Continue lasix PRN.   Supplement electrolytes to maintain K >4 and Mg >2.   Encourage PO intake.  Encourage OOB to chair and ambulation with PT.  Encourage deep breathing exercised and coughing.  Wean O2 requirements as able.   Follow up AM CXR.   Analgesics PRN for pain.   Continue bowel regimen.
S/p MV Repair on 11/6.  Continue ASA, and Lopressor.  Encourage CDBE/IS and increased mobilization.  d/c MSI mepilex today.  Shower today and daily thereafter.  WIll remove MSI staples after shower.  Plan for discharge home today.  Discussed with Dr. De Dios in AM rounds.
Wean pressor as tolerated. 1 PRBC given for HCT 24.   Beta blocker as tolerated by HR and SBP once off Levo.  Aspirin   Encourage PO intake  Encourage OOB to chair and ambulation with PT  Encourage deep breathing exercised and coughing  Chest PT and IS use with bedside nurse
s/p MV Repair on 11/6  Orthostatic yesterday, received 2 uts RBCs, no obvious bleeding  Continue Aspirin   Encourage PO intake  Encourage OOB to chair and ambulation with PT  Encourage deep breathing exercised and coughing

## 2020-11-12 ENCOUNTER — NON-APPOINTMENT (OUTPATIENT)
Age: 65
End: 2020-11-12

## 2020-11-13 ENCOUNTER — NON-APPOINTMENT (OUTPATIENT)
Age: 65
End: 2020-11-13

## 2020-11-14 ENCOUNTER — TRANSCRIPTION ENCOUNTER (OUTPATIENT)
Age: 65
End: 2020-11-14

## 2020-11-15 ENCOUNTER — EMERGENCY (EMERGENCY)
Facility: HOSPITAL | Age: 65
LOS: 1 days | Discharge: HOME | End: 2020-11-15
Attending: EMERGENCY MEDICINE
Payer: MEDICARE

## 2020-11-15 ENCOUNTER — TRANSCRIPTION ENCOUNTER (OUTPATIENT)
Age: 65
End: 2020-11-15

## 2020-11-15 VITALS
HEART RATE: 111 BPM | WEIGHT: 149.91 LBS | DIASTOLIC BLOOD PRESSURE: 69 MMHG | SYSTOLIC BLOOD PRESSURE: 98 MMHG | RESPIRATION RATE: 22 BRPM | OXYGEN SATURATION: 98 % | HEIGHT: 66 IN | TEMPERATURE: 98 F

## 2020-11-15 VITALS — HEART RATE: 99 BPM | TEMPERATURE: 99 F

## 2020-11-15 DIAGNOSIS — Z98.890 OTHER SPECIFIED POSTPROCEDURAL STATES: Chronic | ICD-10-CM

## 2020-11-15 LAB
ALBUMIN SERPL ELPH-MCNC: 4.6 G/DL — SIGNIFICANT CHANGE UP (ref 3.3–5.2)
ALP SERPL-CCNC: 70 U/L — SIGNIFICANT CHANGE UP (ref 40–120)
ALT FLD-CCNC: 31 U/L — SIGNIFICANT CHANGE UP
ANION GAP SERPL CALC-SCNC: 17 MMOL/L — SIGNIFICANT CHANGE UP (ref 5–17)
APPEARANCE UR: CLEAR — SIGNIFICANT CHANGE UP
APTT BLD: 29.5 SEC — SIGNIFICANT CHANGE UP (ref 27.5–35.5)
AST SERPL-CCNC: 26 U/L — SIGNIFICANT CHANGE UP
BACTERIA # UR AUTO: NEGATIVE — SIGNIFICANT CHANGE UP
BASOPHILS # BLD AUTO: 0.03 K/UL — SIGNIFICANT CHANGE UP (ref 0–0.2)
BASOPHILS NFR BLD AUTO: 0.3 % — SIGNIFICANT CHANGE UP (ref 0–2)
BILIRUB SERPL-MCNC: 0.6 MG/DL — SIGNIFICANT CHANGE UP (ref 0.4–2)
BILIRUB UR-MCNC: NEGATIVE — SIGNIFICANT CHANGE UP
BLD GP AB SCN SERPL QL: SIGNIFICANT CHANGE UP
BUN SERPL-MCNC: 34 MG/DL — HIGH (ref 8–20)
CALCIUM SERPL-MCNC: 9.7 MG/DL — SIGNIFICANT CHANGE UP (ref 8.6–10.2)
CHLORIDE SERPL-SCNC: 97 MMOL/L — LOW (ref 98–107)
CO2 SERPL-SCNC: 20 MMOL/L — LOW (ref 22–29)
COLOR SPEC: YELLOW — SIGNIFICANT CHANGE UP
CREAT SERPL-MCNC: 0.65 MG/DL — SIGNIFICANT CHANGE UP (ref 0.5–1.3)
DIFF PNL FLD: ABNORMAL
EOSINOPHIL # BLD AUTO: 0.04 K/UL — SIGNIFICANT CHANGE UP (ref 0–0.5)
EOSINOPHIL NFR BLD AUTO: 0.4 % — SIGNIFICANT CHANGE UP (ref 0–6)
EPI CELLS # UR: SIGNIFICANT CHANGE UP
GLUCOSE SERPL-MCNC: 114 MG/DL — HIGH (ref 70–99)
GLUCOSE UR QL: NEGATIVE MG/DL — SIGNIFICANT CHANGE UP
HCT VFR BLD CALC: 40.3 % — SIGNIFICANT CHANGE UP (ref 34.5–45)
HGB BLD-MCNC: 13.8 G/DL — SIGNIFICANT CHANGE UP (ref 11.5–15.5)
IMM GRANULOCYTES NFR BLD AUTO: 0.7 % — SIGNIFICANT CHANGE UP (ref 0–1.5)
INR BLD: 1.12 RATIO — SIGNIFICANT CHANGE UP (ref 0.88–1.16)
KETONES UR-MCNC: ABNORMAL
LEUKOCYTE ESTERASE UR-ACNC: ABNORMAL
LYMPHOCYTES # BLD AUTO: 1.72 K/UL — SIGNIFICANT CHANGE UP (ref 1–3.3)
LYMPHOCYTES # BLD AUTO: 17.6 % — SIGNIFICANT CHANGE UP (ref 13–44)
MCHC RBC-ENTMCNC: 30.6 PG — SIGNIFICANT CHANGE UP (ref 27–34)
MCHC RBC-ENTMCNC: 34.2 GM/DL — SIGNIFICANT CHANGE UP (ref 32–36)
MCV RBC AUTO: 89.4 FL — SIGNIFICANT CHANGE UP (ref 80–100)
MONOCYTES # BLD AUTO: 0.85 K/UL — SIGNIFICANT CHANGE UP (ref 0–0.9)
MONOCYTES NFR BLD AUTO: 8.7 % — SIGNIFICANT CHANGE UP (ref 2–14)
NEUTROPHILS # BLD AUTO: 7.09 K/UL — SIGNIFICANT CHANGE UP (ref 1.8–7.4)
NEUTROPHILS NFR BLD AUTO: 72.3 % — SIGNIFICANT CHANGE UP (ref 43–77)
NITRITE UR-MCNC: NEGATIVE — SIGNIFICANT CHANGE UP
NT-PROBNP SERPL-SCNC: 2390 PG/ML — HIGH (ref 0–300)
PH UR: 5 — SIGNIFICANT CHANGE UP (ref 5–8)
PLATELET # BLD AUTO: 384 K/UL — SIGNIFICANT CHANGE UP (ref 150–400)
POTASSIUM SERPL-MCNC: 4.5 MMOL/L — SIGNIFICANT CHANGE UP (ref 3.5–5.3)
POTASSIUM SERPL-SCNC: 4.5 MMOL/L — SIGNIFICANT CHANGE UP (ref 3.5–5.3)
PROT SERPL-MCNC: 7.3 G/DL — SIGNIFICANT CHANGE UP (ref 6.6–8.7)
PROT UR-MCNC: 15 MG/DL
PROTHROM AB SERPL-ACNC: 12.9 SEC — SIGNIFICANT CHANGE UP (ref 10.6–13.6)
RBC # BLD: 4.51 M/UL — SIGNIFICANT CHANGE UP (ref 3.8–5.2)
RBC # FLD: 13.6 % — SIGNIFICANT CHANGE UP (ref 10.3–14.5)
RBC CASTS # UR COMP ASSIST: SIGNIFICANT CHANGE UP /HPF (ref 0–4)
SODIUM SERPL-SCNC: 134 MMOL/L — LOW (ref 135–145)
SP GR SPEC: 1.02 — SIGNIFICANT CHANGE UP (ref 1.01–1.02)
TROPONIN T SERPL-MCNC: 0.03 NG/ML — SIGNIFICANT CHANGE UP (ref 0–0.06)
UROBILINOGEN FLD QL: NEGATIVE MG/DL — SIGNIFICANT CHANGE UP
WBC # BLD: 9.8 K/UL — SIGNIFICANT CHANGE UP (ref 3.8–10.5)
WBC # FLD AUTO: 9.8 K/UL — SIGNIFICANT CHANGE UP (ref 3.8–10.5)
WBC UR QL: SIGNIFICANT CHANGE UP

## 2020-11-15 PROCEDURE — 36415 COLL VENOUS BLD VENIPUNCTURE: CPT

## 2020-11-15 PROCEDURE — C8929: CPT

## 2020-11-15 PROCEDURE — 80053 COMPREHEN METABOLIC PANEL: CPT

## 2020-11-15 PROCEDURE — 81001 URINALYSIS AUTO W/SCOPE: CPT

## 2020-11-15 PROCEDURE — 83880 ASSAY OF NATRIURETIC PEPTIDE: CPT

## 2020-11-15 PROCEDURE — 99284 EMERGENCY DEPT VISIT MOD MDM: CPT | Mod: 25

## 2020-11-15 PROCEDURE — 99285 EMERGENCY DEPT VISIT HI MDM: CPT

## 2020-11-15 PROCEDURE — 71045 X-RAY EXAM CHEST 1 VIEW: CPT

## 2020-11-15 PROCEDURE — 85730 THROMBOPLASTIN TIME PARTIAL: CPT

## 2020-11-15 PROCEDURE — 86900 BLOOD TYPING SEROLOGIC ABO: CPT

## 2020-11-15 PROCEDURE — 93010 ELECTROCARDIOGRAM REPORT: CPT

## 2020-11-15 PROCEDURE — 93005 ELECTROCARDIOGRAM TRACING: CPT

## 2020-11-15 PROCEDURE — 93306 TTE W/DOPPLER COMPLETE: CPT | Mod: 26

## 2020-11-15 PROCEDURE — 84484 ASSAY OF TROPONIN QUANT: CPT

## 2020-11-15 PROCEDURE — 87186 SC STD MICRODIL/AGAR DIL: CPT

## 2020-11-15 PROCEDURE — 85610 PROTHROMBIN TIME: CPT

## 2020-11-15 PROCEDURE — 87086 URINE CULTURE/COLONY COUNT: CPT

## 2020-11-15 PROCEDURE — 71045 X-RAY EXAM CHEST 1 VIEW: CPT | Mod: 26

## 2020-11-15 PROCEDURE — 85025 COMPLETE CBC W/AUTO DIFF WBC: CPT

## 2020-11-15 PROCEDURE — 86850 RBC ANTIBODY SCREEN: CPT

## 2020-11-15 PROCEDURE — 86901 BLOOD TYPING SEROLOGIC RH(D): CPT

## 2020-11-15 RX ORDER — SODIUM CHLORIDE 9 MG/ML
500 INJECTION INTRAMUSCULAR; INTRAVENOUS; SUBCUTANEOUS ONCE
Refills: 0 | Status: COMPLETED | OUTPATIENT
Start: 2020-11-15 | End: 2020-11-15

## 2020-11-15 RX ORDER — SODIUM CHLORIDE 9 MG/ML
1000 INJECTION INTRAMUSCULAR; INTRAVENOUS; SUBCUTANEOUS ONCE
Refills: 0 | Status: COMPLETED | OUTPATIENT
Start: 2020-11-15 | End: 2020-11-15

## 2020-11-15 RX ADMIN — SODIUM CHLORIDE 250 MILLILITER(S): 9 INJECTION INTRAMUSCULAR; INTRAVENOUS; SUBCUTANEOUS at 13:51

## 2020-11-15 RX ADMIN — SODIUM CHLORIDE 1000 MILLILITER(S): 9 INJECTION INTRAMUSCULAR; INTRAVENOUS; SUBCUTANEOUS at 10:22

## 2020-11-15 NOTE — ED PROVIDER NOTE - PATIENT PORTAL LINK FT
You can access the FollowMyHealth Patient Portal offered by [facility] by registering at the following website: [email or web address]. By joining [employer]’s FollowMyHealth portal, you will also be able to view your health information using other applications (apps) compatible with our system.

## 2020-11-15 NOTE — ED ADULT NURSE NOTE - OBJECTIVE STATEMENT
Statement Selected 64y/o female c/o weakness. pt states o have mitral valve replacement last week. pt aox4, resp even unlabored, +ROM to all extremities with equal sensation and strength. Pt denies any pain at this time, abd soft non tender, non distended, denies difficulty urinating, midsternal surgical incision clean and open to air. will continue to monitor

## 2020-11-15 NOTE — ED PROVIDER NOTE - PHYSICAL EXAMINATION
Gen: uncomfortable appearing  Head: NCAT  HEENT: oral mucosa dry, normal conjunctiva  Lung: CTAB, no respiratory distress, no wheezing, rales, rhonchi  CV: normal s1/s2, tachycardic, no murmurs, Normal perfusion, sternotomy incision site well approximated, clean, dry intact  Abd: soft, NTND  MSK: No edema, no visible deformities, full range of motion in all 4 extremities  Neuro: No focal neurologic deficits  Psych: normal affect   Rectal: Brown stool

## 2020-11-15 NOTE — ED PROVIDER NOTE - CLINICAL SUMMARY MEDICAL DECISION MAKING FREE TEXT BOX
66yo F with episode of near syncope- found to be orthostatic by EMS (BP 70s systolic upon standing), likely dehydration vs acute blood loss. Check labs, ekg, cxr, TTE, give fluids, CT surgery at bedside following. Renee Sierra DO

## 2020-11-15 NOTE — ED PROVIDER NOTE - NSFOLLOWUPINSTRUCTIONS_ED_ALL_ED_FT
1. Follow up with your cardiothoracic surgeon as planned.   2.  Return to the Emergency Department for worsening, progressive or any other concerning symptoms.       Dehydration    WHAT YOU NEED TO KNOW:    Dehydration is a condition that develops when your body does not have enough fluid. You may become dehydrated if you do not drink enough water or lose too much fluid. Fluid loss may also cause loss of electrolytes (minerals), such as sodium.    DISCHARGE INSTRUCTIONS:    Return to the emergency department if:   •You have a seizure.      •You are confused or cannot think clearly.      •You are extremely sleepy, or another person cannot wake you.       •You become dizzy or faint when you stand.      •You are not able to urinate.      •You have trouble breathing.      •You have a fast or irregular heartbeat.      •Your hands or feet are cold, or your face is pale.       Contact your healthcare provider if:   •You have trouble drinking liquids because you are vomiting.      •Your symptoms get worse.      •You have a fever.       •You feel very weak or tired.      •You have questions or concerns about your condition or care.      Follow up with your healthcare provider as directed: Write down your questions so you remember to ask them during your visits.     Prevent or manage dehydration:   •Drink liquids as directed. Liquids that contain water, sugar, and minerals can help your body hold in fluid and help prevent dehydration. Drink liquids throughout the day, not just when you feel thirsty. Men should drink about 3 liters (13 eight-ounce cups) of liquid each day. Women should drink about 2 liters (9 eight-ounce cups) of liquid each day. Drink even more liquid if you will be outdoors, in the sun for a long time, or exercising.       •Stay cool. Limit the time you spend outdoors during the hottest part of the day. Dress in lightweight clothes.       •Keep track of how often you urinate. If you urinate less than usual or your urine is darker, drink more liquids.

## 2020-11-15 NOTE — CHART NOTE - NSCHARTNOTEFT_GEN_A_CORE
Patient known to service, 65F s/p MV repair 11/6 with uneventful post-op course.  Presents today with dizziness, pre-syncope, hypotension noted by EMS while patient was standing.  Appears dehydrated - hyperdynamic LV on echo, otherwise normal.  No effusions.  pre-renal azotemia on chem.  Patient had positive response to 1.5L crystalloid infusion.  Feels much better, orthostatics performed and are now negative.  We will send her home as she is improved from arrival.  She had been restricting her fluid intake which she has been instructed to liberalize.  We will hold the diuretics, and reduce the dose of metoprolol for now since her  is concerned that this will cause further blood pressure problems that she never had before.  Discussed with Dr. Younger.

## 2020-11-15 NOTE — ED ADULT TRIAGE NOTE - CHIEF COMPLAINT QUOTE
Pt BIBA for SOB, weakness and near-syncope x 2 days, had mitral valve replacement last week here, as per EMS, orthostatic hyotension noted upon arrival, AOx3, states "ask my , he knows better" upon RN questioning, denies chest pain, denies fever/chills, incision site clean and dry

## 2020-11-15 NOTE — ED ADULT NURSE REASSESSMENT NOTE - NS ED NURSE REASSESS COMMENT FT1
pt care assumed 1535, report received from MARLA GARCIA. Pt is resting in bed comfortably at this time, no apparent distress noted at this time. pt safety maintained. Pt denies any complaints at this time. pt educated on plan of care, plan of care taught back to RN. plan of care education deemed proficient through successful teach back. will continue to reeducate pt regarding plan of care.

## 2020-11-15 NOTE — ED CLERICAL - NS ED CLERK NOTE PRE-ARRIVAL INFORMATION; ADDITIONAL PRE-ARRIVAL INFORMATION
This patient is enrolled in the Follow Your Heart program and has undergone a cardiac surgery procedure and has active care navigation. This patient can be followed up by the care navigation team within 24 hours. To arrange close follow-up or to obtain additional clinical information about this patient, please call the contact number above. Please call the cardiac surgery team once patient is registered at [phone] for consultation PRIOR to disposition decision.  The patient recently underwent a cardiac surgery procedure and the team can assist in acute medical management.

## 2020-11-15 NOTE — ED PROVIDER NOTE - OBJECTIVE STATEMENT
64yo female with PMH mitral valve disease s/p mitral valve replacement ~ 1 week ago by Dr. De Dios presenting with lightheadedness, near syncope, and weakness. Patient states that symptoms started yesterday. Denies chest pain or shortness of breath. Denies black/bloody stools. No fevers/chills. No nausea/vomiting. No diarrhea.

## 2020-11-16 ENCOUNTER — APPOINTMENT (OUTPATIENT)
Dept: CARE COORDINATION | Facility: HOME HEALTH | Age: 65
End: 2020-11-16
Payer: MEDICARE

## 2020-11-16 VITALS
TEMPERATURE: 98 F | HEART RATE: 98 BPM | SYSTOLIC BLOOD PRESSURE: 120 MMHG | OXYGEN SATURATION: 98 % | BODY MASS INDEX: 24.41 KG/M2 | WEIGHT: 143 LBS | RESPIRATION RATE: 16 BRPM | HEIGHT: 64 IN | DIASTOLIC BLOOD PRESSURE: 82 MMHG

## 2020-11-16 PROCEDURE — 99024 POSTOP FOLLOW-UP VISIT: CPT

## 2020-11-16 RX ORDER — ASPIRIN 325 MG/1
325 TABLET, COATED ORAL
Refills: 0 | Status: ACTIVE | COMMUNITY

## 2020-11-16 RX ORDER — FOLIC ACID 1 MG/1
1 TABLET ORAL
Refills: 0 | Status: ACTIVE | COMMUNITY

## 2020-11-16 RX ORDER — PNV NO.95/FERROUS FUM/FOLIC AC 28MG-0.8MG
TABLET ORAL
Refills: 0 | Status: ACTIVE | COMMUNITY

## 2020-11-16 RX ORDER — MULTIVIT-MIN/FOLIC/VIT K/LYCOP 400-300MCG
500 TABLET ORAL
Refills: 0 | Status: ACTIVE | COMMUNITY

## 2020-11-16 RX ORDER — FERROUS SULFATE TAB EC 324 MG (65 MG FE EQUIVALENT) 324 (65 FE) MG
324 (65 FE) TABLET DELAYED RESPONSE ORAL
Refills: 0 | Status: ACTIVE | COMMUNITY

## 2020-11-16 RX ORDER — METOPROLOL TARTRATE 25 MG/1
25 TABLET, FILM COATED ORAL
Qty: 60 | Refills: 0 | Status: ACTIVE | COMMUNITY
Start: 2020-11-11

## 2020-11-16 NOTE — REASON FOR VISIT
[Follow-Up: _____] : a [unfilled] follow-up visit [Spouse] : spouse [FreeTextEntry1] : FOLLOW YOUR HEART- Transitional Care Management Program- [facility]\par \par

## 2020-11-16 NOTE — PHYSICAL EXAM
[Sclera] : the sclera and conjunctiva were normal [Neck Appearance] : the appearance of the neck was normal [Respiration, Rhythm And Depth] : normal respiratory rhythm and effort [Exaggerated Use Of Accessory Muscles For Inspiration] : no accessory muscle use [Auscultation Breath Sounds / Voice Sounds] : lungs were clear to auscultation bilaterally [Apical Impulse] : the apical impulse was normal [Heart Rate And Rhythm] : heart rate was normal and rhythm regular [Heart Sounds] : normal S1 and S2 [Chest Visual Inspection Thoracic Asymmetry] : no chest asymmetry [FreeTextEntry1] : MSI and CT sites without erythema, drainage or warmth, with edges well approximated. Sternum stable [1+] : left 1+ [FreeTextEntry2] : B/L LE without edema, B/L calves soft, NT [Bowel Sounds] : normal bowel sounds [Abdomen Soft] : soft [Abdomen Tenderness] : non-tender [Musculoskeletal - Swelling] : no joint swelling seen [Skin Color & Pigmentation] : normal skin color and pigmentation [Skin Turgor] : normal skin turgor [] : no rash [Oriented To Time, Place, And Person] : oriented to person, place, and time [Impaired Insight] : insight and judgment were intact [Affect] : the affect was normal

## 2020-11-16 NOTE — ASSESSMENT
[FreeTextEntry1] : Pt recovering well at home s/p OHS. Reviewed all medications and dosages with pt understanding. Pt has all medications in home and is taking as prescribed. Pain not always controlled with Tylenol 1000 mg every 8 hrs. Pt prescribed oxycodone 5 mg #15 tabs to take every 8 hrs PRN breakthrough or severe pain. Bowel regimen reviewed with pt understanding. Pt symptoms resolved from ED visit. HR slightly elevated though pt is due for BB dosing. Advised to log daily BP and HR. Spoke with EDITH Reeves during visit and she will assess pt tomorrow. She is feeling well and has No further new symptoms, issues or concerns to report at this time. Pt  very supportive. \par

## 2020-11-16 NOTE — HISTORY OF PRESENT ILLNESS
[FreeTextEntry1] : 65 YOF with pmhx including MVP and anxiety. Pt underwent an MV Repair with Dr. De Dios on 11/6/20. Post op course unremarkable and pt discharged home with support of spouse and home care services. Pt recently evaluated in [facility] ED for near syncope. Pt echo WNL, pt hypotension and symptoms resolved with IV crystalloid and she was discharged back home. Lasix d/pilar'victorino.  Initial [facility] visit completed in home. \par CC: "I"m feeling much better today"

## 2020-11-19 NOTE — CDI QUERY NOTE - NSCDIOTHERTXTBX_GEN_ALL_CORE_HH
SUPPORTING DOCUMENTATION / EVIDENCE:  Admitted with severe mitral insufficiency s/p mitral valve repair 11/6/20  Ruptured chord mentioned in op report    BRIEF OP: repair mitral valve w JAMIN 30 mm Coelho band, P2 butterfly resection   Spec:  P2 mitral valve leaflet    FULL OP:  OPERATION:  MITRAL VALVE REPAIR WITH BUTTERFLY RESECTION OF P2, NEOCHORDS X 2 TO THE POSTERIOR LEAFLET, AND #30 INOCENTE COELHO BAND    BODY: ..."The mitral valve was exposed with a Inocente retractor.  Valve analysis was performed.  P2 was prolapsed with a ruptured chord.....butterfly resection of P2 was perfomed and two 4-0 Gortex pledgeted suture was brought from the papillary muscle to the posterior leaflet after measuring a normal chord height...."        Please clarify, in addendum to dc summary, the full name of chord that was ruptured, in order for  to capture diagnosis.   - ruptured chordae tendineae   - other   - not clinically significant      Thank you

## 2020-11-24 PROBLEM — I34.1 MITRAL VALVE PROLAPSE SYNDROME: Status: ACTIVE | Noted: 2020-10-27

## 2020-12-01 ENCOUNTER — APPOINTMENT (OUTPATIENT)
Dept: CARDIOTHORACIC SURGERY | Facility: CLINIC | Age: 65
End: 2020-12-01
Payer: MEDICARE

## 2020-12-01 DIAGNOSIS — I34.1 NONRHEUMATIC MITRAL (VALVE) PROLAPSE: ICD-10-CM

## 2020-12-08 ENCOUNTER — APPOINTMENT (OUTPATIENT)
Dept: CARDIOTHORACIC SURGERY | Facility: CLINIC | Age: 65
End: 2020-12-08
Payer: MEDICARE

## 2020-12-08 VITALS
TEMPERATURE: 98.2 F | HEIGHT: 64 IN | OXYGEN SATURATION: 98 % | DIASTOLIC BLOOD PRESSURE: 73 MMHG | HEART RATE: 90 BPM | WEIGHT: 146 LBS | BODY MASS INDEX: 24.92 KG/M2 | SYSTOLIC BLOOD PRESSURE: 111 MMHG | RESPIRATION RATE: 16 BRPM

## 2020-12-08 DIAGNOSIS — Z98.890 OTHER SPECIFIED POSTPROCEDURAL STATES: ICD-10-CM

## 2020-12-08 DIAGNOSIS — I05.9 RHEUMATIC MITRAL VALVE DISEASE, UNSPECIFIED: ICD-10-CM

## 2020-12-08 PROCEDURE — 99024 POSTOP FOLLOW-UP VISIT: CPT

## 2020-12-11 ENCOUNTER — TRANSCRIPTION ENCOUNTER (OUTPATIENT)
Age: 65
End: 2020-12-11

## 2021-01-04 NOTE — H&P PST ADULT - PSYCHIATRIC
details… (0) Answers both questions correctly Affect and characteristics of appearance, verbalizations, behaviors are appropriate

## 2022-06-23 ENCOUNTER — APPOINTMENT (OUTPATIENT)
Dept: OTOLARYNGOLOGY | Facility: CLINIC | Age: 67
End: 2022-06-23

## 2022-06-23 VITALS — BODY MASS INDEX: 24.75 KG/M2 | TEMPERATURE: 97.8 F | WEIGHT: 145 LBS | HEIGHT: 64 IN

## 2022-06-23 DIAGNOSIS — H90.3 SENSORINEURAL HEARING LOSS, BILATERAL: ICD-10-CM

## 2022-06-23 DIAGNOSIS — H61.23 IMPACTED CERUMEN, BILATERAL: ICD-10-CM

## 2022-06-23 DIAGNOSIS — H81.13 BENIGN PAROXYSMAL VERTIGO, BILATERAL: ICD-10-CM

## 2022-06-23 PROCEDURE — 69210 REMOVE IMPACTED EAR WAX UNI: CPT

## 2022-06-23 PROCEDURE — 99213 OFFICE O/P EST LOW 20 MIN: CPT | Mod: 25

## 2022-06-23 RX ORDER — SERTRALINE HYDROCHLORIDE 50 MG/1
50 TABLET, FILM COATED ORAL
Qty: 30 | Refills: 0 | Status: ACTIVE | COMMUNITY
Start: 2022-05-05

## 2022-06-23 RX ORDER — LATANOPROST/PF 0.005 %
0.01 DROPS OPHTHALMIC (EYE)
Qty: 8 | Refills: 0 | Status: ACTIVE | COMMUNITY
Start: 2022-01-25

## 2022-06-23 RX ORDER — BRIMONIDINE TARTRATE 2 MG/MG
0.2 SOLUTION/ DROPS OPHTHALMIC
Qty: 15 | Refills: 0 | Status: ACTIVE | COMMUNITY
Start: 2022-05-05

## 2022-06-23 RX ORDER — TIMOLOL MALEATE 5 MG/ML
0.5 SOLUTION OPHTHALMIC
Qty: 15 | Refills: 0 | Status: ACTIVE | COMMUNITY
Start: 2022-05-05

## 2022-06-23 NOTE — ASSESSMENT
[FreeTextEntry1] : tanvi cleared au\par mild sn hearing loss\par probable bppv\par vng\par vestibular rehab referral

## 2022-06-23 NOTE — HISTORY OF PRESENT ILLNESS
[de-identified] : co imbalance x several months\par having evaluation w neurologist\par to have vng and question of ear impaction\par recurring vertigo w head turning generally brief\par hx mild symmetric loss

## 2022-06-23 NOTE — PHYSICAL EXAM
[Midline] : trachea located in midline position [Normal] : no rashes [de-identified] : dayne brown

## 2022-12-05 NOTE — DISCHARGE NOTE PROVIDER - NSDCCONDITION_GEN_ALL_CORE
Pt presents to [facility] triage after being seen in office by Dr. Erin Marie. Pt states she was sent over because of protein in her urine. Placed on EFM, VSS, consent signed. Stable

## 2023-01-05 ENCOUNTER — OFFICE (OUTPATIENT)
Dept: URBAN - METROPOLITAN AREA CLINIC 113 | Facility: CLINIC | Age: 68
Setting detail: OPHTHALMOLOGY
End: 2023-01-05
Payer: MEDICARE

## 2023-01-05 DIAGNOSIS — H40.1113: ICD-10-CM

## 2023-01-05 DIAGNOSIS — H40.1122: ICD-10-CM

## 2023-01-05 PROCEDURE — 92012 INTRM OPH EXAM EST PATIENT: CPT | Performed by: OPHTHALMOLOGY

## 2023-01-05 ASSESSMENT — REFRACTION_CURRENTRX
OD_ADD: +2.75
OS_OVR_VA: 20/
OD_OVR_VA: 20/
OS_ADD: +2.75

## 2023-01-05 ASSESSMENT — CONFRONTATIONAL VISUAL FIELD TEST (CVF)
OD_FINDINGS: FULL
OS_FINDINGS: FULL

## 2023-01-05 ASSESSMENT — KERATOMETRY
OS_K2POWER_DIOPTERS: 40.50
METHOD_AUTO_MANUAL: AUTO
OS_K1POWER_DIOPTERS: 40.00
OD_K2POWER_DIOPTERS: 41.50
OD_K1POWER_DIOPTERS: 40.25
OS_AXISANGLE_DEGREES: 148
OD_AXISANGLE_DEGREES: 092

## 2023-01-05 ASSESSMENT — REFRACTION_AUTOREFRACTION
OD_CYLINDER: -1.00
OD_SPHERE: +0.50
OS_SPHERE: +0.75
OS_AXIS: 066
OS_CYLINDER: -1.25
OD_AXIS: 102

## 2023-01-05 ASSESSMENT — REFRACTION_MANIFEST
OD_ADD: +2.75
OD_SPHERE: PLANO
OS_ADD: +2.75
OS_SPHERE: PLANO

## 2023-01-05 ASSESSMENT — SPHEQUIV_DERIVED
OS_SPHEQUIV: 0.125
OD_SPHEQUIV: 0

## 2023-01-05 ASSESSMENT — VISUAL ACUITY
OS_BCVA: 20/60+2
OD_BCVA: 20/30+1

## 2023-01-05 ASSESSMENT — AXIALLENGTH_DERIVED
OD_AL: 24.5972
OS_AL: 24.7954

## 2023-01-05 ASSESSMENT — TONOMETRY
OS_IOP_MMHG: 12
OD_IOP_MMHG: 12

## 2023-05-04 ENCOUNTER — OFFICE (OUTPATIENT)
Dept: URBAN - METROPOLITAN AREA CLINIC 113 | Facility: CLINIC | Age: 68
Setting detail: OPHTHALMOLOGY
End: 2023-05-04
Payer: MEDICARE

## 2023-05-04 DIAGNOSIS — H40.1122: ICD-10-CM

## 2023-05-04 DIAGNOSIS — H40.1113: ICD-10-CM

## 2023-05-04 PROCEDURE — 92014 COMPRE OPH EXAM EST PT 1/>: CPT | Performed by: OPHTHALMOLOGY

## 2023-05-04 PROCEDURE — 92133 CPTRZD OPH DX IMG PST SGM ON: CPT | Performed by: OPHTHALMOLOGY

## 2023-05-04 ASSESSMENT — KERATOMETRY
OD_K1POWER_DIOPTERS: 40.50
OS_K2POWER_DIOPTERS: 40.75
OS_K1POWER_DIOPTERS: 40.25
OS_AXISANGLE_DEGREES: 147
METHOD_AUTO_MANUAL: AUTO
OD_AXISANGLE_DEGREES: 036
OD_K2POWER_DIOPTERS: 41.75

## 2023-05-04 ASSESSMENT — VISUAL ACUITY
OD_BCVA: 20/30
OS_BCVA: 20/100+1

## 2023-05-04 ASSESSMENT — REFRACTION_MANIFEST
OD_SPHERE: PLANO
OD_ADD: +2.75
OS_SPHERE: PLANO
OS_ADD: +2.75

## 2023-05-04 ASSESSMENT — REFRACTION_AUTOREFRACTION
OD_CYLINDER: -0.50
OD_AXIS: 146
OS_SPHERE: +0.50
OD_SPHERE: PLANO
OS_AXIS: 074
OS_CYLINDER: -1.00

## 2023-05-04 ASSESSMENT — TONOMETRY
OS_IOP_MMHG: 13
OD_IOP_MMHG: 13

## 2023-05-04 ASSESSMENT — CONFRONTATIONAL VISUAL FIELD TEST (CVF)
OS_FINDINGS: FULL
OD_FINDINGS: FULL

## 2023-05-04 ASSESSMENT — REFRACTION_CURRENTRX
OD_OVR_VA: 20/
OS_ADD: +2.75
OS_OVR_VA: 20/
OD_ADD: +2.75

## 2023-05-04 ASSESSMENT — SPHEQUIV_DERIVED: OS_SPHEQUIV: 0

## 2023-05-04 ASSESSMENT — AXIALLENGTH_DERIVED: OS_AL: 24.7478

## 2023-06-01 NOTE — ASU PREOP CHECKLIST - TO WHOM
Take all medications as prescribed.     Drink plenty of fluids and get a lot of rest.    Keep stitches clean and dry.     Return to ER for any changes or worsening of symptoms.    Milvia GUTIÉRREZ

## 2023-09-14 ENCOUNTER — OFFICE (OUTPATIENT)
Dept: URBAN - METROPOLITAN AREA CLINIC 113 | Facility: CLINIC | Age: 68
Setting detail: OPHTHALMOLOGY
End: 2023-09-14
Payer: MEDICARE

## 2023-09-14 ENCOUNTER — RX ONLY (RX ONLY)
Age: 68
End: 2023-09-14

## 2023-09-14 DIAGNOSIS — H40.1113: ICD-10-CM

## 2023-09-14 DIAGNOSIS — H40.1122: ICD-10-CM

## 2023-09-14 PROCEDURE — 92083 EXTENDED VISUAL FIELD XM: CPT | Performed by: OPHTHALMOLOGY

## 2023-09-14 PROCEDURE — 92014 COMPRE OPH EXAM EST PT 1/>: CPT | Performed by: OPHTHALMOLOGY

## 2023-09-14 PROCEDURE — 92250 FUNDUS PHOTOGRAPHY W/I&R: CPT | Performed by: OPHTHALMOLOGY

## 2023-09-14 ASSESSMENT — KERATOMETRY
OS_K1POWER_DIOPTERS: 39.00
OS_AXISANGLE_DEGREES: 151
OD_K1POWER_DIOPTERS: 39.75
OS_K2POWER_DIOPTERS: 40.25
OD_K2POWER_DIOPTERS: 41.25
OD_AXISANGLE_DEGREES: 037
METHOD_AUTO_MANUAL: AUTO

## 2023-09-14 ASSESSMENT — TONOMETRY
OD_IOP_MMHG: 11
OS_IOP_MMHG: 11

## 2023-09-14 ASSESSMENT — VISUAL ACUITY
OS_BCVA: 20/400
OD_BCVA: 20/60-1

## 2023-09-14 ASSESSMENT — SUPERFICIAL PUNCTATE KERATITIS (SPK)
OS_SPK: T
OD_SPK: T

## 2023-09-14 ASSESSMENT — REFRACTION_CURRENTRX
OD_OVR_VA: 20/
OD_ADD: +2.75
OS_OVR_VA: 20/
OS_ADD: +2.75

## 2023-09-14 ASSESSMENT — SPHEQUIV_DERIVED
OS_SPHEQUIV: 0.25
OD_SPHEQUIV: 0.25

## 2023-09-14 ASSESSMENT — REFRACTION_MANIFEST
OD_SPHERE: PLANO
OS_ADD: +2.75
OS_SPHERE: PLANO
OD_ADD: +2.75

## 2023-09-14 ASSESSMENT — CONFRONTATIONAL VISUAL FIELD TEST (CVF)
OD_FINDINGS: FULL
OS_FINDINGS: FULL

## 2023-09-14 ASSESSMENT — AXIALLENGTH_DERIVED
OS_AL: 24.9968
OD_AL: 24.6411

## 2023-09-14 ASSESSMENT — REFRACTION_AUTOREFRACTION
OS_CYLINDER: -1.00
OS_AXIS: 047
OS_SPHERE: +0.75
OD_SPHERE: +1.00
OD_AXIS: 111
OD_CYLINDER: -1.50

## 2024-01-18 ENCOUNTER — OFFICE (OUTPATIENT)
Dept: URBAN - METROPOLITAN AREA CLINIC 113 | Facility: CLINIC | Age: 69
Setting detail: OPHTHALMOLOGY
End: 2024-01-18
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
## 2024-01-18 PROCEDURE — NO SHOW FE NO SHOW FEE: Performed by: OPHTHALMOLOGY

## 2024-04-05 ENCOUNTER — OFFICE (OUTPATIENT)
Dept: URBAN - METROPOLITAN AREA CLINIC 94 | Facility: CLINIC | Age: 69
Setting detail: OPHTHALMOLOGY
End: 2024-04-05
Payer: MEDICARE

## 2024-04-05 DIAGNOSIS — H40.1113: ICD-10-CM

## 2024-04-05 DIAGNOSIS — H04.123: ICD-10-CM

## 2024-04-05 DIAGNOSIS — H40.1122: ICD-10-CM

## 2024-04-05 PROCEDURE — 92012 INTRM OPH EXAM EST PATIENT: CPT | Performed by: OPHTHALMOLOGY

## 2024-08-08 ENCOUNTER — OFFICE (OUTPATIENT)
Dept: URBAN - METROPOLITAN AREA CLINIC 113 | Facility: CLINIC | Age: 69
Setting detail: OPHTHALMOLOGY
End: 2024-08-08
Payer: MEDICARE

## 2024-08-08 DIAGNOSIS — H40.1113: ICD-10-CM

## 2024-08-08 DIAGNOSIS — H04.123: ICD-10-CM

## 2024-08-08 DIAGNOSIS — H40.1122: ICD-10-CM

## 2024-08-08 PROCEDURE — 92020 GONIOSCOPY: CPT | Performed by: OPHTHALMOLOGY

## 2024-08-08 PROCEDURE — 92133 CPTRZD OPH DX IMG PST SGM ON: CPT | Performed by: OPHTHALMOLOGY

## 2024-08-08 PROCEDURE — 99213 OFFICE O/P EST LOW 20 MIN: CPT | Performed by: OPHTHALMOLOGY

## 2024-08-08 ASSESSMENT — CONFRONTATIONAL VISUAL FIELD TEST (CVF)
OS_FINDINGS: FULL
OD_FINDINGS: FULL

## 2024-12-10 PROBLEM — H11.043 PTERYGIUM-PERIPHERAL ; BOTH EYES: Status: ACTIVE | Noted: 2024-12-10

## 2024-12-19 ENCOUNTER — OFFICE (OUTPATIENT)
Dept: URBAN - METROPOLITAN AREA CLINIC 113 | Facility: CLINIC | Age: 69
Setting detail: OPHTHALMOLOGY
End: 2024-12-19
Payer: MEDICARE

## 2024-12-19 DIAGNOSIS — H40.1122: ICD-10-CM

## 2024-12-19 DIAGNOSIS — H04.123: ICD-10-CM

## 2024-12-19 DIAGNOSIS — H40.1113: ICD-10-CM

## 2024-12-19 PROCEDURE — 92014 COMPRE OPH EXAM EST PT 1/>: CPT | Performed by: OPHTHALMOLOGY

## 2024-12-19 PROCEDURE — 92250 FUNDUS PHOTOGRAPHY W/I&R: CPT | Performed by: OPHTHALMOLOGY

## 2024-12-19 ASSESSMENT — REFRACTION_AUTOREFRACTION
OS_CYLINDER: -1.25
OD_AXIS: 098
OS_SPHERE: +0.75
OD_CYLINDER: -0.50
OS_AXIS: 067
OD_SPHERE: 0.00

## 2024-12-19 ASSESSMENT — REFRACTION_MANIFEST
OS_SPHERE: PLANO
OD_SPHERE: PLANO
OD_ADD: +2.75
OS_ADD: +2.75

## 2024-12-19 ASSESSMENT — VISUAL ACUITY
OD_BCVA: 20/30
OS_BCVA: 20/350

## 2024-12-19 ASSESSMENT — KERATOMETRY
OD_K2POWER_DIOPTERS: 40.75
OS_K1POWER_DIOPTERS: 40.50
OS_K2POWER_DIOPTERS: 40.75
OD_AXISANGLE_DEGREES: 139
OS_AXISANGLE_DEGREES: 147
OD_K1POWER_DIOPTERS: 40.25
METHOD_AUTO_MANUAL: AUTO

## 2024-12-19 ASSESSMENT — REFRACTION_CURRENTRX
OD_ADD: +2.75
OS_ADD: +2.75
OS_OVR_VA: 20/
OD_OVR_VA: 20/

## 2024-12-19 ASSESSMENT — TONOMETRY
OD_IOP_MMHG: 12
OS_IOP_MMHG: 12

## 2024-12-19 ASSESSMENT — CONFRONTATIONAL VISUAL FIELD TEST (CVF)
OS_FINDINGS: FULL
OD_FINDINGS: FULL

## 2024-12-19 ASSESSMENT — SUPERFICIAL PUNCTATE KERATITIS (SPK)
OD_SPK: T
OS_SPK: T

## 2024-12-31 ENCOUNTER — APPOINTMENT (OUTPATIENT)
Dept: FAMILY MEDICINE | Facility: CLINIC | Age: 69
End: 2024-12-31

## 2025-04-03 ENCOUNTER — OFFICE (OUTPATIENT)
Dept: URBAN - METROPOLITAN AREA CLINIC 113 | Facility: CLINIC | Age: 70
Setting detail: OPHTHALMOLOGY
End: 2025-04-03
Payer: MEDICARE

## 2025-04-03 DIAGNOSIS — H40.1113: ICD-10-CM

## 2025-04-03 DIAGNOSIS — H04.123: ICD-10-CM

## 2025-04-03 DIAGNOSIS — H40.1122: ICD-10-CM

## 2025-04-03 PROCEDURE — 92012 INTRM OPH EXAM EST PATIENT: CPT | Performed by: OPHTHALMOLOGY

## 2025-04-03 ASSESSMENT — REFRACTION_CURRENTRX
OD_OVR_VA: 20/
OS_OVR_VA: 20/
OS_ADD: +2.75
OD_ADD: +2.75

## 2025-04-03 ASSESSMENT — REFRACTION_MANIFEST
OS_SPHERE: PLANO
OD_ADD: +2.75
OS_ADD: +2.75
OD_SPHERE: PLANO

## 2025-04-03 ASSESSMENT — SUPERFICIAL PUNCTATE KERATITIS (SPK)
OS_SPK: T
OD_SPK: T

## 2025-04-03 ASSESSMENT — TONOMETRY
OD_IOP_MMHG: 11
OS_IOP_MMHG: 12

## 2025-04-03 ASSESSMENT — KERATOMETRY
OS_K2POWER_DIOPTERS: 40.75
OD_K1POWER_DIOPTERS: 40.25
OS_AXISANGLE_DEGREES: 147
OD_AXISANGLE_DEGREES: 139
OD_K2POWER_DIOPTERS: 40.75
METHOD_AUTO_MANUAL: AUTO
OS_K1POWER_DIOPTERS: 40.50

## 2025-04-03 ASSESSMENT — REFRACTION_AUTOREFRACTION
OS_AXIS: 067
OD_AXIS: 098
OS_CYLINDER: -1.25
OD_CYLINDER: -0.50
OD_SPHERE: 0.00
OS_SPHERE: +0.75

## 2025-04-03 ASSESSMENT — VISUAL ACUITY
OD_BCVA: 20/70-1
OS_BCVA: 20/300

## 2025-04-03 ASSESSMENT — CONFRONTATIONAL VISUAL FIELD TEST (CVF)
OD_FINDINGS: FULL
OS_FINDINGS: FULL

## 2025-08-07 ENCOUNTER — OFFICE (OUTPATIENT)
Dept: URBAN - METROPOLITAN AREA CLINIC 113 | Facility: CLINIC | Age: 70
Setting detail: OPHTHALMOLOGY
End: 2025-08-07
Payer: MEDICARE

## 2025-08-07 DIAGNOSIS — H40.1113: ICD-10-CM

## 2025-08-07 DIAGNOSIS — H04.123: ICD-10-CM

## 2025-08-07 DIAGNOSIS — H40.1122: ICD-10-CM

## 2025-08-07 DIAGNOSIS — H26.493: ICD-10-CM

## 2025-08-07 PROCEDURE — 92012 INTRM OPH EXAM EST PATIENT: CPT | Performed by: OPHTHALMOLOGY

## 2025-08-07 ASSESSMENT — REFRACTION_MANIFEST
OD_SPHERE: PLANO
OS_ADD: +2.75
OS_SPHERE: PLANO
OD_ADD: +2.75

## 2025-08-07 ASSESSMENT — KERATOMETRY
OD_AXISANGLE_DEGREES: 076
OD_K2POWER_DIOPTERS: 40.75
OS_AXISANGLE_DEGREES: 146
METHOD_AUTO_MANUAL: AUTO
OD_K1POWER_DIOPTERS: 40.25
OS_K2POWER_DIOPTERS: 39.75
OS_K1POWER_DIOPTERS: 39.00

## 2025-08-07 ASSESSMENT — REFRACTION_CURRENTRX
OS_OVR_VA: 20/
OD_OVR_VA: 20/
OD_ADD: +2.75
OS_ADD: +2.75

## 2025-08-07 ASSESSMENT — VISUAL ACUITY
OD_BCVA: 20/70-1
OS_BCVA: 20/300

## 2025-08-07 ASSESSMENT — REFRACTION_AUTOREFRACTION
OD_SPHERE: PLANO
OS_SPHERE: UTP
OD_CYLINDER: -1.00
OD_AXIS: 093

## 2025-08-07 ASSESSMENT — CONFRONTATIONAL VISUAL FIELD TEST (CVF)
OD_FINDINGS: FULL
OS_FINDINGS: FULL

## 2025-08-07 ASSESSMENT — SUPERFICIAL PUNCTATE KERATITIS (SPK)
OS_SPK: T
OD_SPK: T

## 2025-08-07 ASSESSMENT — TONOMETRY
OS_IOP_MMHG: 11
OD_IOP_MMHG: 11